# Patient Record
Sex: MALE | Race: WHITE | Employment: OTHER
[De-identification: names, ages, dates, MRNs, and addresses within clinical notes are randomized per-mention and may not be internally consistent; named-entity substitution may affect disease eponyms.]

---

## 2017-05-22 ENCOUNTER — HOSPITAL ENCOUNTER (OUTPATIENT)
Dept: PHYSICAL THERAPY | Facility: CLINIC | Age: 67
Setting detail: THERAPIES SERIES
Discharge: HOME | End: 2017-05-22
Payer: MEDICARE

## 2017-05-22 PROCEDURE — 97161 PT EVAL LOW COMPLEX 20 MIN: CPT

## 2017-05-22 PROCEDURE — G8979 MOBILITY GOAL STATUS: HCPCS

## 2017-05-22 PROCEDURE — G8978 MOBILITY CURRENT STATUS: HCPCS

## 2017-05-22 PROCEDURE — 97110 THERAPEUTIC EXERCISES: CPT

## 2017-05-24 ENCOUNTER — HOSPITAL ENCOUNTER (OUTPATIENT)
Dept: PHYSICAL THERAPY | Facility: CLINIC | Age: 67
Setting detail: THERAPIES SERIES
Discharge: HOME | End: 2017-05-24
Payer: MEDICARE

## 2017-05-24 PROCEDURE — 97110 THERAPEUTIC EXERCISES: CPT

## 2017-05-30 ENCOUNTER — HOSPITAL ENCOUNTER (OUTPATIENT)
Dept: PHYSICAL THERAPY | Facility: CLINIC | Age: 67
Setting detail: THERAPIES SERIES
Discharge: HOME | End: 2017-05-30
Payer: MEDICARE

## 2017-05-31 ENCOUNTER — HOSPITAL ENCOUNTER (OUTPATIENT)
Dept: PHYSICAL THERAPY | Facility: CLINIC | Age: 67
Setting detail: THERAPIES SERIES
Discharge: HOME | End: 2017-05-31
Payer: MEDICARE

## 2017-05-31 PROCEDURE — 97140 MANUAL THERAPY 1/> REGIONS: CPT

## 2017-05-31 PROCEDURE — 97110 THERAPEUTIC EXERCISES: CPT

## 2017-06-02 ENCOUNTER — HOSPITAL ENCOUNTER (OUTPATIENT)
Dept: PHYSICAL THERAPY | Facility: CLINIC | Age: 67
Setting detail: THERAPIES SERIES
Discharge: HOME | End: 2017-06-02
Payer: MEDICARE

## 2017-06-02 PROCEDURE — 97140 MANUAL THERAPY 1/> REGIONS: CPT

## 2017-06-02 PROCEDURE — 97110 THERAPEUTIC EXERCISES: CPT

## 2017-06-05 ENCOUNTER — HOSPITAL ENCOUNTER (OUTPATIENT)
Dept: PHYSICAL THERAPY | Facility: CLINIC | Age: 67
Setting detail: THERAPIES SERIES
Discharge: HOME | End: 2017-06-05
Payer: MEDICARE

## 2017-06-05 PROCEDURE — 97110 THERAPEUTIC EXERCISES: CPT

## 2017-06-05 PROCEDURE — 97140 MANUAL THERAPY 1/> REGIONS: CPT

## 2017-06-07 ENCOUNTER — HOSPITAL ENCOUNTER (OUTPATIENT)
Dept: PHYSICAL THERAPY | Facility: CLINIC | Age: 67
Setting detail: THERAPIES SERIES
Discharge: HOME | End: 2017-06-07
Payer: MEDICARE

## 2017-06-07 PROCEDURE — 97110 THERAPEUTIC EXERCISES: CPT

## 2017-06-09 ENCOUNTER — HOSPITAL ENCOUNTER (OUTPATIENT)
Dept: PHYSICAL THERAPY | Facility: CLINIC | Age: 67
Setting detail: THERAPIES SERIES
Discharge: HOME | End: 2017-06-09
Payer: MEDICARE

## 2017-06-09 PROCEDURE — 97140 MANUAL THERAPY 1/> REGIONS: CPT

## 2017-06-09 PROCEDURE — 97110 THERAPEUTIC EXERCISES: CPT

## 2017-06-12 ENCOUNTER — HOSPITAL ENCOUNTER (OUTPATIENT)
Dept: PHYSICAL THERAPY | Facility: CLINIC | Age: 67
Setting detail: THERAPIES SERIES
Discharge: HOME | End: 2017-06-12
Payer: MEDICARE

## 2017-06-12 PROCEDURE — 97110 THERAPEUTIC EXERCISES: CPT

## 2017-06-12 PROCEDURE — 97140 MANUAL THERAPY 1/> REGIONS: CPT

## 2017-06-14 ENCOUNTER — HOSPITAL ENCOUNTER (OUTPATIENT)
Dept: PHYSICAL THERAPY | Facility: CLINIC | Age: 67
Setting detail: THERAPIES SERIES
Discharge: HOME | End: 2017-06-14
Payer: MEDICARE

## 2017-06-14 PROCEDURE — 97140 MANUAL THERAPY 1/> REGIONS: CPT

## 2017-06-14 PROCEDURE — 97110 THERAPEUTIC EXERCISES: CPT

## 2017-06-16 ENCOUNTER — HOSPITAL ENCOUNTER (OUTPATIENT)
Dept: PHYSICAL THERAPY | Facility: CLINIC | Age: 67
Setting detail: THERAPIES SERIES
Discharge: HOME | End: 2017-06-16
Payer: MEDICARE

## 2017-06-16 PROCEDURE — 97032 APPL MODALITY 1+ESTIM EA 15: CPT

## 2017-06-16 PROCEDURE — 97110 THERAPEUTIC EXERCISES: CPT

## 2017-06-21 ENCOUNTER — HOSPITAL ENCOUNTER (OUTPATIENT)
Dept: PHYSICAL THERAPY | Facility: CLINIC | Age: 67
Setting detail: THERAPIES SERIES
Discharge: HOME | End: 2017-06-21
Payer: MEDICARE

## 2017-06-21 PROCEDURE — 97110 THERAPEUTIC EXERCISES: CPT

## 2017-06-21 PROCEDURE — 97140 MANUAL THERAPY 1/> REGIONS: CPT

## 2017-06-23 ENCOUNTER — HOSPITAL ENCOUNTER (OUTPATIENT)
Dept: PHYSICAL THERAPY | Facility: CLINIC | Age: 67
Setting detail: THERAPIES SERIES
Discharge: HOME | End: 2017-06-23
Payer: MEDICARE

## 2017-06-23 PROCEDURE — G8979 MOBILITY GOAL STATUS: HCPCS

## 2017-06-23 PROCEDURE — 97110 THERAPEUTIC EXERCISES: CPT

## 2017-06-23 PROCEDURE — 97140 MANUAL THERAPY 1/> REGIONS: CPT

## 2017-06-23 PROCEDURE — G8978 MOBILITY CURRENT STATUS: HCPCS

## 2017-06-26 ENCOUNTER — HOSPITAL ENCOUNTER (OUTPATIENT)
Dept: PHYSICAL THERAPY | Facility: CLINIC | Age: 67
Setting detail: THERAPIES SERIES
Discharge: HOME | End: 2017-06-26
Payer: MEDICARE

## 2017-06-26 PROCEDURE — 97110 THERAPEUTIC EXERCISES: CPT

## 2017-06-26 PROCEDURE — 97140 MANUAL THERAPY 1/> REGIONS: CPT

## 2017-06-28 ENCOUNTER — HOSPITAL ENCOUNTER (OUTPATIENT)
Dept: PHYSICAL THERAPY | Facility: CLINIC | Age: 67
Setting detail: THERAPIES SERIES
Discharge: HOME | End: 2017-06-28
Payer: MEDICARE

## 2017-06-28 PROCEDURE — 97140 MANUAL THERAPY 1/> REGIONS: CPT

## 2017-06-28 PROCEDURE — 97110 THERAPEUTIC EXERCISES: CPT

## 2017-06-30 ENCOUNTER — HOSPITAL ENCOUNTER (OUTPATIENT)
Dept: PHYSICAL THERAPY | Facility: CLINIC | Age: 67
Setting detail: THERAPIES SERIES
Discharge: HOME | End: 2017-06-30
Payer: MEDICARE

## 2017-06-30 PROCEDURE — 97110 THERAPEUTIC EXERCISES: CPT

## 2017-07-05 ENCOUNTER — HOSPITAL ENCOUNTER (OUTPATIENT)
Dept: PHYSICAL THERAPY | Facility: CLINIC | Age: 67
Setting detail: THERAPIES SERIES
Discharge: HOME | End: 2017-07-05
Payer: MEDICARE

## 2017-07-05 PROCEDURE — 97110 THERAPEUTIC EXERCISES: CPT

## 2017-07-07 ENCOUNTER — HOSPITAL ENCOUNTER (OUTPATIENT)
Dept: PHYSICAL THERAPY | Facility: CLINIC | Age: 67
Setting detail: THERAPIES SERIES
Discharge: HOME | End: 2017-07-07
Payer: MEDICARE

## 2017-07-07 PROCEDURE — 97140 MANUAL THERAPY 1/> REGIONS: CPT

## 2017-07-07 PROCEDURE — 97110 THERAPEUTIC EXERCISES: CPT

## 2017-07-10 ENCOUNTER — HOSPITAL ENCOUNTER (OUTPATIENT)
Dept: PHYSICAL THERAPY | Facility: CLINIC | Age: 67
Setting detail: THERAPIES SERIES
Discharge: HOME | End: 2017-07-10
Payer: MEDICARE

## 2017-07-12 ENCOUNTER — HOSPITAL ENCOUNTER (OUTPATIENT)
Dept: PHYSICAL THERAPY | Facility: CLINIC | Age: 67
Setting detail: THERAPIES SERIES
Discharge: HOME | End: 2017-07-12
Payer: MEDICARE

## 2017-07-12 PROCEDURE — 97140 MANUAL THERAPY 1/> REGIONS: CPT

## 2017-07-12 PROCEDURE — 97110 THERAPEUTIC EXERCISES: CPT

## 2017-07-14 ENCOUNTER — APPOINTMENT (OUTPATIENT)
Dept: PHYSICAL THERAPY | Facility: CLINIC | Age: 67
End: 2017-07-14
Payer: MEDICARE

## 2017-07-17 ENCOUNTER — APPOINTMENT (OUTPATIENT)
Dept: PHYSICAL THERAPY | Facility: CLINIC | Age: 67
End: 2017-07-17
Payer: MEDICARE

## 2017-07-19 ENCOUNTER — HOSPITAL ENCOUNTER (OUTPATIENT)
Dept: PHYSICAL THERAPY | Facility: CLINIC | Age: 67
Setting detail: THERAPIES SERIES
Discharge: HOME | End: 2017-07-19
Payer: MEDICARE

## 2017-07-19 PROCEDURE — 97110 THERAPEUTIC EXERCISES: CPT

## 2017-07-21 ENCOUNTER — HOSPITAL ENCOUNTER (OUTPATIENT)
Dept: PHYSICAL THERAPY | Facility: CLINIC | Age: 67
Setting detail: THERAPIES SERIES
Discharge: HOME | End: 2017-07-21
Payer: MEDICARE

## 2017-07-21 PROCEDURE — G8978 MOBILITY CURRENT STATUS: HCPCS

## 2017-07-21 PROCEDURE — G8979 MOBILITY GOAL STATUS: HCPCS

## 2017-07-21 PROCEDURE — 97164 PT RE-EVAL EST PLAN CARE: CPT

## 2017-07-21 PROCEDURE — 97110 THERAPEUTIC EXERCISES: CPT

## 2017-07-26 ENCOUNTER — HOSPITAL ENCOUNTER (OUTPATIENT)
Dept: PHYSICAL THERAPY | Facility: CLINIC | Age: 67
Setting detail: THERAPIES SERIES
Discharge: HOME | End: 2017-07-26
Payer: MEDICARE

## 2017-07-26 PROCEDURE — 97110 THERAPEUTIC EXERCISES: CPT

## 2017-07-28 ENCOUNTER — APPOINTMENT (OUTPATIENT)
Dept: PHYSICAL THERAPY | Facility: CLINIC | Age: 67
End: 2017-07-28
Payer: MEDICARE

## 2017-08-04 ENCOUNTER — HOSPITAL ENCOUNTER (OUTPATIENT)
Dept: PHYSICAL THERAPY | Facility: CLINIC | Age: 67
Setting detail: THERAPIES SERIES
Discharge: HOME | End: 2017-08-04
Payer: MEDICARE

## 2017-08-04 PROCEDURE — 97110 THERAPEUTIC EXERCISES: CPT

## 2017-08-07 ENCOUNTER — HOSPITAL ENCOUNTER (OUTPATIENT)
Dept: PHYSICAL THERAPY | Facility: CLINIC | Age: 67
Setting detail: THERAPIES SERIES
Discharge: HOME | End: 2017-08-07
Payer: MEDICARE

## 2017-08-07 PROCEDURE — 97110 THERAPEUTIC EXERCISES: CPT

## 2018-05-26 ENCOUNTER — HOSPITAL ENCOUNTER (INPATIENT)
Age: 68
LOS: 1 days | Discharge: HOME | DRG: 918 | End: 2018-05-26
Attending: EMERGENCY MEDICINE | Admitting: FAMILY MEDICINE
Payer: MEDICARE

## 2018-05-26 VITALS
BODY MASS INDEX: 47.74 KG/M2 | TEMPERATURE: 97.7 F | HEART RATE: 51 BPM | DIASTOLIC BLOOD PRESSURE: 77 MMHG | WEIGHT: 315 LBS | SYSTOLIC BLOOD PRESSURE: 164 MMHG | OXYGEN SATURATION: 95 % | RESPIRATION RATE: 16 BRPM | HEIGHT: 68 IN

## 2018-05-26 DIAGNOSIS — E16.2 HYPOGLYCEMIA: Primary | ICD-10-CM

## 2018-05-26 PROBLEM — E79.0 HYPERURICEMIA: Chronic | Status: ACTIVE | Noted: 2018-05-26

## 2018-05-26 PROBLEM — G47.33 SLEEP APNEA, OBSTRUCTIVE: Chronic | Status: ACTIVE | Noted: 2018-05-26

## 2018-05-26 PROBLEM — T38.3X5A HYPOGLYCEMIA DUE TO INSULIN: Status: ACTIVE | Noted: 2018-05-26

## 2018-05-26 PROBLEM — E16.0 HYPOGLYCEMIA DUE TO INSULIN: Status: RESOLVED | Noted: 2018-05-26 | Resolved: 2018-05-26

## 2018-05-26 PROBLEM — I10 ESSENTIAL HYPERTENSION: Chronic | Status: ACTIVE | Noted: 2018-05-26

## 2018-05-26 PROBLEM — E16.0 HYPOGLYCEMIA DUE TO INSULIN: Status: ACTIVE | Noted: 2018-05-26

## 2018-05-26 PROBLEM — T38.3X5A HYPOGLYCEMIA DUE TO INSULIN: Status: RESOLVED | Noted: 2018-05-26 | Resolved: 2018-05-26

## 2018-05-26 PROBLEM — M10.00 IDIOPATHIC GOUT: Chronic | Status: ACTIVE | Noted: 2018-05-26

## 2018-05-26 LAB
ANION GAP SERPL CALCULATED.3IONS-SCNC: 14 MMOL/L (ref 9–17)
BUN BLDV-MCNC: 17 MG/DL (ref 8–23)
BUN/CREAT BLD: ABNORMAL (ref 9–20)
CALCIUM SERPL-MCNC: 9.3 MG/DL (ref 8.6–10.4)
CHLORIDE BLD-SCNC: 103 MMOL/L (ref 98–107)
CO2: 26 MMOL/L (ref 20–31)
CREAT SERPL-MCNC: 0.8 MG/DL (ref 0.7–1.2)
GFR AFRICAN AMERICAN: >60 ML/MIN
GFR NON-AFRICAN AMERICAN: >60 ML/MIN
GFR SERPL CREATININE-BSD FRML MDRD: ABNORMAL ML/MIN/{1.73_M2}
GFR SERPL CREATININE-BSD FRML MDRD: ABNORMAL ML/MIN/{1.73_M2}
GLUCOSE BLD-MCNC: 110 MG/DL (ref 75–110)
GLUCOSE BLD-MCNC: 114 MG/DL (ref 75–110)
GLUCOSE BLD-MCNC: 116 MG/DL (ref 75–110)
GLUCOSE BLD-MCNC: 155 MG/DL (ref 75–110)
GLUCOSE BLD-MCNC: 55 MG/DL (ref 70–99)
GLUCOSE BLD-MCNC: 64 MG/DL (ref 75–110)
POTASSIUM SERPL-SCNC: 4.7 MMOL/L (ref 3.7–5.3)
SODIUM BLD-SCNC: 143 MMOL/L (ref 135–144)

## 2018-05-26 PROCEDURE — 82947 ASSAY GLUCOSE BLOOD QUANT: CPT

## 2018-05-26 PROCEDURE — 36415 COLL VENOUS BLD VENIPUNCTURE: CPT

## 2018-05-26 PROCEDURE — 2580000003 HC RX 258: Performed by: EMERGENCY MEDICINE

## 2018-05-26 PROCEDURE — 99285 EMERGENCY DEPT VISIT HI MDM: CPT

## 2018-05-26 PROCEDURE — 1210000000 HC MED SURG R&B

## 2018-05-26 PROCEDURE — G0378 HOSPITAL OBSERVATION PER HR: HCPCS

## 2018-05-26 PROCEDURE — 83036 HEMOGLOBIN GLYCOSYLATED A1C: CPT

## 2018-05-26 PROCEDURE — 6370000000 HC RX 637 (ALT 250 FOR IP): Performed by: FAMILY MEDICINE

## 2018-05-26 PROCEDURE — 80048 BASIC METABOLIC PNL TOTAL CA: CPT

## 2018-05-26 PROCEDURE — 99239 HOSP IP/OBS DSCHRG MGMT >30: CPT | Performed by: FAMILY MEDICINE

## 2018-05-26 PROCEDURE — 6370000000 HC RX 637 (ALT 250 FOR IP): Performed by: NURSE PRACTITIONER

## 2018-05-26 RX ORDER — FUROSEMIDE 40 MG/1
TABLET ORAL
Status: ON HOLD | COMMUNITY
Start: 2017-02-13 | End: 2018-05-26 | Stop reason: HOSPADM

## 2018-05-26 RX ORDER — ONDANSETRON 4 MG/1
TABLET, ORALLY DISINTEGRATING ORAL
Status: ON HOLD | COMMUNITY
Start: 2017-12-18 | End: 2018-05-26 | Stop reason: HOSPADM

## 2018-05-26 RX ORDER — AMMONIUM LACTATE 12 G/100G
CREAM TOPICAL
COMMUNITY
Start: 2018-05-03

## 2018-05-26 RX ORDER — MECLIZINE HYDROCHLORIDE CHEWABLE TABLETS 25 MG/1
25 TABLET, CHEWABLE ORAL
Status: ON HOLD | COMMUNITY
Start: 2017-12-18 | End: 2018-05-26 | Stop reason: HOSPADM

## 2018-05-26 RX ORDER — DEXTROSE MONOHYDRATE 25 G/50ML
12.5 INJECTION, SOLUTION INTRAVENOUS PRN
Status: DISCONTINUED | OUTPATIENT
Start: 2018-05-26 | End: 2018-05-26 | Stop reason: HOSPADM

## 2018-05-26 RX ORDER — PANTOPRAZOLE SODIUM 40 MG/1
TABLET, DELAYED RELEASE ORAL
Status: ON HOLD | COMMUNITY
Start: 2017-10-20 | End: 2018-05-26 | Stop reason: HOSPADM

## 2018-05-26 RX ORDER — LORATADINE AND PSEUDOEPHEDRINE 10; 240 MG/1; MG/1
TABLET, EXTENDED RELEASE ORAL
Status: ON HOLD | COMMUNITY
Start: 2016-02-18 | End: 2018-05-26 | Stop reason: HOSPADM

## 2018-05-26 RX ORDER — PANTOPRAZOLE SODIUM 40 MG/1
40 TABLET, DELAYED RELEASE ORAL DAILY
Status: DISCONTINUED | OUTPATIENT
Start: 2018-05-26 | End: 2018-05-26 | Stop reason: HOSPADM

## 2018-05-26 RX ORDER — INSULIN GLARGINE 100 [IU]/ML
INJECTION, SOLUTION SUBCUTANEOUS
COMMUNITY

## 2018-05-26 RX ORDER — DEXTROSE MONOHYDRATE 50 MG/ML
100 INJECTION, SOLUTION INTRAVENOUS PRN
Status: DISCONTINUED | OUTPATIENT
Start: 2018-05-26 | End: 2018-05-26 | Stop reason: HOSPADM

## 2018-05-26 RX ORDER — DEXTROSE MONOHYDRATE 25 G/50ML
INJECTION, SOLUTION INTRAVENOUS
Status: DISCONTINUED
Start: 2018-05-26 | End: 2018-05-26 | Stop reason: HOSPADM

## 2018-05-26 RX ORDER — ACETAMINOPHEN 500 MG
1000 TABLET ORAL 2 TIMES DAILY
COMMUNITY

## 2018-05-26 RX ORDER — AMLODIPINE BESYLATE 5 MG/1
5 TABLET ORAL DAILY
Status: DISCONTINUED | OUTPATIENT
Start: 2018-05-26 | End: 2018-05-26

## 2018-05-26 RX ORDER — INSULIN GLARGINE 100 [IU]/ML
82 INJECTION, SOLUTION SUBCUTANEOUS NIGHTLY
Status: DISCONTINUED | OUTPATIENT
Start: 2018-05-26 | End: 2018-05-26 | Stop reason: HOSPADM

## 2018-05-26 RX ORDER — DEXTROSE MONOHYDRATE 25 G/50ML
25 INJECTION, SOLUTION INTRAVENOUS ONCE
Status: COMPLETED | OUTPATIENT
Start: 2018-05-26 | End: 2018-05-26

## 2018-05-26 RX ORDER — FUROSEMIDE 40 MG/1
40 TABLET ORAL DAILY
Status: DISCONTINUED | OUTPATIENT
Start: 2018-05-26 | End: 2018-05-26

## 2018-05-26 RX ORDER — CHLORTHALIDONE 25 MG/1
25 TABLET ORAL DAILY
Qty: 30 TABLET | Refills: 3 | Status: ON HOLD | OUTPATIENT
Start: 2018-05-26 | End: 2021-11-23

## 2018-05-26 RX ORDER — ASPIRIN 81 MG/1
81 TABLET ORAL DAILY
Status: DISCONTINUED | OUTPATIENT
Start: 2018-05-26 | End: 2018-05-26 | Stop reason: HOSPADM

## 2018-05-26 RX ORDER — ALISKIREN 150 MG/1
TABLET, FILM COATED ORAL
COMMUNITY
Start: 2017-08-11

## 2018-05-26 RX ORDER — METFORMIN HYDROCHLORIDE 500 MG/1
1000 TABLET, EXTENDED RELEASE ORAL
COMMUNITY
Start: 2016-05-12

## 2018-05-26 RX ORDER — GLIPIZIDE 5 MG/1
10 TABLET ORAL
Status: DISCONTINUED | OUTPATIENT
Start: 2018-05-26 | End: 2018-05-26 | Stop reason: HOSPADM

## 2018-05-26 RX ORDER — CHLORTHALIDONE 25 MG/1
25 TABLET ORAL DAILY
Status: DISCONTINUED | OUTPATIENT
Start: 2018-05-26 | End: 2018-05-26 | Stop reason: HOSPADM

## 2018-05-26 RX ORDER — NICOTINE POLACRILEX 4 MG
15 LOZENGE BUCCAL PRN
Status: DISCONTINUED | OUTPATIENT
Start: 2018-05-26 | End: 2018-05-26 | Stop reason: HOSPADM

## 2018-05-26 RX ORDER — ALLOPURINOL 300 MG/1
TABLET ORAL
Status: ON HOLD | COMMUNITY
Start: 2017-10-20 | End: 2018-05-26 | Stop reason: HOSPADM

## 2018-05-26 RX ORDER — AMLODIPINE BESYLATE 5 MG/1
TABLET ORAL
Status: ON HOLD | COMMUNITY
Start: 2017-08-11 | End: 2018-05-26 | Stop reason: HOSPADM

## 2018-05-26 RX ORDER — GLIPIZIDE 10 MG/1
TABLET, FILM COATED, EXTENDED RELEASE ORAL
COMMUNITY
Start: 2016-04-07

## 2018-05-26 RX ORDER — ALISKIREN 150 MG/1
150 TABLET, FILM COATED ORAL DAILY
Status: DISCONTINUED | OUTPATIENT
Start: 2018-05-26 | End: 2018-05-26 | Stop reason: HOSPADM

## 2018-05-26 RX ORDER — ALLOPURINOL 300 MG/1
300 TABLET ORAL DAILY
Status: DISCONTINUED | OUTPATIENT
Start: 2018-05-26 | End: 2018-05-26

## 2018-05-26 RX ADMIN — FUROSEMIDE 40 MG: 40 TABLET ORAL at 08:54

## 2018-05-26 RX ADMIN — PANTOPRAZOLE SODIUM 40 MG: 40 TABLET, DELAYED RELEASE ORAL at 08:54

## 2018-05-26 RX ADMIN — ASPIRIN 81 MG: 81 TABLET, COATED ORAL at 08:54

## 2018-05-26 RX ADMIN — ALLOPURINOL 300 MG: 300 TABLET ORAL at 08:54

## 2018-05-26 RX ADMIN — DEXTROSE MONOHYDRATE 25 G: 25 INJECTION, SOLUTION INTRAVENOUS at 03:04

## 2018-05-26 RX ADMIN — AMLODIPINE BESYLATE 5 MG: 5 TABLET ORAL at 08:54

## 2018-05-26 RX ADMIN — DEXTROSE AND SODIUM CHLORIDE 500 ML: 5; 450 INJECTION, SOLUTION INTRAVENOUS at 03:05

## 2018-05-26 RX ADMIN — Medication 15 G: at 06:28

## 2018-05-26 ASSESSMENT — PAIN SCALES - GENERAL: PAINLEVEL_OUTOF10: 0

## 2018-05-26 ASSESSMENT — ENCOUNTER SYMPTOMS
SHORTNESS OF BREATH: 0
ABDOMINAL PAIN: 0
DIARRHEA: 0
COUGH: 0
VOMITING: 0
SORE THROAT: 0
NAUSEA: 0

## 2018-05-27 LAB
ESTIMATED AVERAGE GLUCOSE: 160 MG/DL
HBA1C MFR BLD: 7.2 % (ref 4–6)

## 2020-03-03 ENCOUNTER — HOSPITAL ENCOUNTER (OUTPATIENT)
Dept: PHYSICAL THERAPY | Facility: CLINIC | Age: 70
Setting detail: THERAPIES SERIES
Discharge: HOME | End: 2020-03-03
Payer: MEDICARE

## 2020-03-03 PROCEDURE — 97161 PT EVAL LOW COMPLEX 20 MIN: CPT

## 2020-03-03 PROCEDURE — 97110 THERAPEUTIC EXERCISES: CPT

## 2020-03-03 NOTE — CONSULTS
[] [address]  [address]  [phone]  F: [phone] [] [address] [facility]   [address]  P: [phone]  F: [phone] [x] [place]  [address] [facility]  P: [phone]  F: [phone] [] [address]  [facility]   [address]   [place]: [phone]  F: [phone]      Physical Therapy Lower Extremity Evaluation    Date:  3/3/2020  Patient: Dena Corbett   : 1950  MRN: [id number]  Physician: Dr. Jacquelin Samuels: [facility]  Medical Diagnosis: L knee OA  Rehab Codes: E45.863, M25.662  Onset date: 3/3/17    Next Dr's appt. : 3/5    Subjective:   CC: pre op status for L TKA on 3/5. HPI: scheduled for L TKA on 3/5 due to OA in L knee. PMHx: [] Unremarkable [] Diabetes [] HTN  [] Pacemaker   [] MI/Heart Problems [x] Cancer  [] Arthritis [x] Other: Sabino ILA, Fused 2-5              [x] Refer to full medical chart  In EPIC       Comorbidities:   [x] Obesity [] Dialysis  [x] Other:T2DM   [] Asthma/COPD [] Dementia [x] Other: prostate CA   [] Stroke [] Sleep apnea [] Other:   [] Vascular disease [] Rheumatic disease [] Other:     Tests: [x] X-Ray: [] MRI:  [] Other:    Medications: [x] Refer to full medical record [] None [] Other:  Allergies:      [x] Refer to full medical record [] None [] Other:    Function:  Hand Dominance  [] Right  [] Left  Patient lives with:     In what type of home []  One story   [] Two story   [x] Split level   Number of stairs to enter 7+8   With handrail on the []  Right to enter   [x] Left to enter (7)   Bathroom has a []  Tub only  [] Tub/shower combo   [x] Walk in shower    [x]  Grab bars (1) only suctions not secured   Washing machine is on []  Main level   [] Second level   [] Basement   Employer    Job Other:    Specific Instructions for next treatment: he will be immediate post op      Evaluation Complexity:  History (Personal factors, comorbidities) [x] 0 [] 1-2 [] 3+   Exam (limitations, restrictions) [x] 1-2 [] 3 [] 4+   Clinical presentation (progression) [x] Stable [] Evolving  [] Unstable   Decision Making [x] Low [] Moderate [] High    [x] Low Complexity [] Moderate Complexity [] High Complexity       Treatment Charges: Mins Units   [x] Evaluation       [x]  Low       []  Moderate       []  High  1   []  Modalities     [x]  Ther Exercise 25 2   []  Manual Therapy     []  Ther Activities     []  Aquatics     []  Vasocompression     []  Other       TOTAL TREATMENT TIME: 50    Time in:1005   Time Out:1055    Electronically signed by: Tamika Rodríguez PT        Physician Signature:________________________________Date:__________________  By signing above or cosigning this note, I have reviewed this plan of care and certify a need for medically necessary rehabilitation services.      *PLEASE SIGN ABOVE AND FAX BACK ALL PAGES*

## 2020-03-09 ENCOUNTER — HOSPITAL ENCOUNTER (OUTPATIENT)
Dept: PHYSICAL THERAPY | Facility: CLINIC | Age: 70
Setting detail: THERAPIES SERIES
Discharge: HOME | End: 2020-03-09
Payer: MEDICARE

## 2020-03-09 PROCEDURE — 97016 VASOPNEUMATIC DEVICE THERAPY: CPT

## 2020-03-09 PROCEDURE — 97110 THERAPEUTIC EXERCISES: CPT

## 2020-03-09 NOTE — FLOWSHEET NOTE
[] [facility]  [address]  [phone]  F: [phone] [] [address]  [facility]   [address]  P: [phone]  F: [phone] [] [address]  [address] [facility]  P: [phone]  F: [phone] [] [address]  [facility]   [address]   [place]: [phone]  F: [phone]      Physical Therapy Daily Treatment Note    Date:  3/9/2020  Patient Name:  Cody Morales    :  1950  MRN: [id number]  Physician: Dr. Lopez Beams: [facility]  Medical Diagnosis: L knee OA                     Rehab Codes: S72.675, M25.662  Onset date: 3/3/17                                         Next Dr's appt. : 3/17  Visit# / total visits:    Cancels/No Shows: 0/0    Subjective:    Pain:  [x] Yes  [] No Location:L knee Pain Rating: (0-10 scale) 8/10  Pain altered Tx:  [x] No  [] Yes  Action:  Comments: reports that he had a fever this am and went to the hospital.  Dx'd with UTI. Was peeing blood, but has mostly resolved. He did not have a catheter.       Objective:  Modalities:   Treatment Location  Left      Right                          Position   knee [x]          []  [x] Supine    [] Prone   [] Side lying  [] Sitting          Treatment Modality   2 Vasocompression    34° temp    Med pressure     15min   1 Other:  ex       Precautions: standard TKA  Exercises:  Exercise Reps/ Time Weight/ Level Issued for HEP Completed Comments   Nu Step *                         Mat             Quad sets 10x10\"    x  x     SAQs` 3x10    x  x     Glut sets 10x10\"  x     SLR *           Bridges (attempts)            Heel slides AAROM 2x10   x x     Sidelying              Hip abd

## 2020-03-12 ENCOUNTER — HOSPITAL ENCOUNTER (OUTPATIENT)
Dept: PHYSICAL THERAPY | Facility: CLINIC | Age: 70
Setting detail: THERAPIES SERIES
Discharge: HOME | End: 2020-03-12
Payer: MEDICARE

## 2020-03-12 PROCEDURE — 97110 THERAPEUTIC EXERCISES: CPT

## 2020-03-12 PROCEDURE — 97016 VASOPNEUMATIC DEVICE THERAPY: CPT

## 2020-03-12 NOTE — FLOWSHEET NOTE
Demonstrates understanding. [] Needs review. [] Demonstrates/verbalizes HEP/Ed previously given. Plan: [x] Continue current frequency toward long and short term goals.     [] Specific Instructions for subsequent treatments: progress mat/gym ex      Time In: 9:00am            Time Out: 10:00am    Electronically signed by:  Nita Milian PTA

## 2020-03-16 ENCOUNTER — HOSPITAL ENCOUNTER (OUTPATIENT)
Dept: PHYSICAL THERAPY | Facility: CLINIC | Age: 70
Setting detail: THERAPIES SERIES
Discharge: HOME | End: 2020-03-16
Payer: MEDICARE

## 2020-03-16 PROCEDURE — 97110 THERAPEUTIC EXERCISES: CPT

## 2020-03-16 NOTE — FLOWSHEET NOTE
[] [address]  [address]  [phone]  F: [phone] [] [address]  formerly [facility] 36   [address]  P: [phone]  F: [phone] [x] [address]  [address] [facility]  P: [phone]  F: [phone] [] [address]  [facility]   [address]   [place]: [phone]  F: [phone]      Physical Therapy Daily Treatment Note    Date:  3/16/2020  Patient Name:  Ijeoma Winn    :  1950  MRN: [id number]  Physician: Dr. Keven Amor: [facility]  Medical Diagnosis: L knee OA                     Rehab Codes: D21.400, M25.662  Onset date: 3/3/17                                         Next Dr's appt. : 3/17  Visit# / total visits: 3/8   Cancels/No Shows: 0/0    Subjective:   Pt arrives noting continued L knee pain discomfort but is improving daily. Some compliance with HEP per pt but could be better per pt a swell.      Pain:  [x] Yes  [] No Location:L knee Pain Rating: (0-10 scale) 5-6/10  Pain altered Tx:  [x] No  [] Yes  Action:  Comments:       Objective:    L knee AAROM flexion: 94°    Modalities:   Treatment Location  Left      Right                          Position   knee [x]          []  [x] Supine    [] Prone   [] Side lying  [] Sitting          Treatment Modality   2 Vasocompression    34° temp    Med pressure     15min   1 Other:  ex       Precautions: standard TKA  Exercises:  Exercise Reps/ Time Weight/ Level Issued for HEP Completed Comments   Nu Step 10' L3   x  INC           Stool HS stretch 3x20\"   x    Gastroc Wedge 3x20\"      x     Mat             Quad sets 10x10\"   x x     SAQs 2x10  2# x x    Glut sets 10x10\"  x     SLR x10,x5 AAROM    x      Bridges (attempts)            Heel slides AAROM 2x10   x x     Supine knee

## 2020-03-19 ENCOUNTER — HOSPITAL ENCOUNTER (OUTPATIENT)
Dept: PHYSICAL THERAPY | Facility: CLINIC | Age: 70
Setting detail: THERAPIES SERIES
Discharge: HOME | End: 2020-03-19
Payer: MEDICARE

## 2020-03-19 PROCEDURE — 97110 THERAPEUTIC EXERCISES: CPT

## 2020-03-19 NOTE — FLOWSHEET NOTE
Heel slides AAROM 2x10        Supine knee ext ROM 5m 7# x Heel propped, poor tolerance only tolerated approx 45sec          Sidelying           Hip abd                   Prone        Knee hang *      Quad S *               Gym          Heel raises 20x    x     Marching  20x  x    Mini squats 20x  x    EXT ROM          TKE 15x5\"         TG squats 3x10 L14                               Other:    Treatment Charges: Mins Units   []  Modalities     [x]  Ther Exercise 50 3   []  Manual Therapy     []  Ther Activities     []  Aquatics     [x]  Vasocompression     []  Other     Total Treatment time 50 3       Assessment: [] Progressing toward goals. .        [] No change. [x] Other: Pt having difficulty with stamina today. Only able to perform marked exercises and required seated rest breaks and 2 bathroom breaks. Pt unable to complete 5 min with heel propped knee extension and stopped after 2 min. Pt also stopped vaso at end after 3 min. [x] Patient would continue to benefit from skilled physical therapy services in order to: regain ROM, strength, gait, and function. STG: (to be met in1 treatments)  1. Patient to be independent with home exercise program as demonstrated by performance with correct form without cues.     LTG: (to be met in 9 treatments)  1. Able to walk without deviation or device. 2. Pain <3/10 for the long term  3. Able to go shopping without need for device                    Patient goals: \"to feel better\"     Pt. Education:  [x] Yes  [] No  [x] Reviewed Prior HEP/Ed  Method of Education: [x] Verbal  [x] Demo  [] Written  Comprehension of Education:  [] Verbalizes understanding. [x] Demonstrates understanding. [x] Needs review. [] Demonstrates/verbalizes HEP/Ed previously given. Plan: [x] Continue current frequency toward long and short term goals.     [x] Specific Instructions for subsequent treatments: progress mat/gym ex      Time In: 10:00am            Time Out: 11:00am    Electronically signed by:  Natan Terry PTA

## 2020-03-23 ENCOUNTER — HOSPITAL ENCOUNTER (OUTPATIENT)
Dept: PHYSICAL THERAPY | Facility: CLINIC | Age: 70
Setting detail: THERAPIES SERIES
Discharge: HOME | End: 2020-03-23
Payer: MEDICARE

## 2020-03-23 PROCEDURE — 97016 VASOPNEUMATIC DEVICE THERAPY: CPT

## 2020-03-23 PROCEDURE — 97110 THERAPEUTIC EXERCISES: CPT

## 2020-03-23 NOTE — FLOWSHEET NOTE
[] [address]  [address]  [phone]  F: [phone] [] [address]  [facility] 36   [address]  P: [phone]  F: [phone] [x] [address]  [address] [facility]  P: [phone]  F: [phone] [] [address]  [facility]   [address]   [place]: [phone]  F: [phone]      Physical Therapy Daily Treatment Note    Date:  3/23/2020  Patient Name:  Sarath De La Cruz    :  1950  MRN: [id number]  Physician: Dr. Allison Cardona: [facility]  Medical Diagnosis: L knee OA                     Rehab Codes: O74.989, M25.662  Onset date: 3/3/17                                         Next Dr's appt. : 2020  Visit# / total visits:    Cancels/No Shows: 0/0    Subjective:        Pain:  [x] Yes  [] No Location:L knee Pain Rating: (0-10 scale) 5/10  Pain altered Tx:  [x] No  [] Yes  Action:  Comments:  Pt reports L knee pain continues to be moderate, he also states he has not been wearing his compression stockings since Saturday even though his physician recommended him to wear them until tomorrow. PTA advised him to put them back on due to pitting edema in L lower leg.       Objective:    L knee AAROM flexion: 94°  L kneee AAROM in supine 5-113 degrees 3/23/20  Modalities:   Treatment Location  Left      Right                          Position   knee [x]          []  [x] Supine    [] Prone   [] Side lying  [] Sitting          Treatment Modality   2 Vasocompression    34° temp    Med pressure     15min   1 Other:  ex       Precautions: standard TKA  Exercises:  Exercise Reps/ Time Weight/ Level Completed Comments   Sci Fit 8' L3 x  INC          Stool HS stretch 3x30\"  x    Gastroc Wedge 3x20\"    x     Knee flexion S 10x10\" 12\" x Mat           Quad sets 10x10\"   x     SAQs 2x10  2# x    LAQ 20x3\"  x    Glut sets 10x10\"      SLR x10,x5 AAROM       Bridges (attempts)         Heel slides AAROM 2x10   x     Supine knee ext ROM 2m  x Heel propped          Sidelying           Hip abd                   Prone        Knee hang *   Pt states he doesn't have a place to lay on his stomach at home   Quad S *               Gym          Heel raises 20x    x     Marching  20x  x    Mini squats 20x  x    HS curl 20x  x    EXT ROM          TKE 15x5\"         TG squats 3x10 L14  x                             Other:    Treatment Charges: Mins Units   []  Modalities     [x]  Ther Exercise 50 3   []  Manual Therapy     []  Ther Activities     []  Aquatics     [x]  Vasocompression 10 1   []  Other     Total Treatment time 60 4       Assessment: [x] Progressing toward goals. Able to progress pt with standing exercises as listed above with good tolerance. Pt given mod cues for upright posture when ambulating with slight improvement noted. Added heel prop in supine without weight, able to complete x2 min to improve L knee ext. Pt demos improved L knee AAROM 5 degrees to Pt was given handout with standing exercises, TKE with band, quad sets, SAQ, LAQ, heel slides with strap, and seated propped extension stretch with good understanding by pt.     [] No change. [] Other:   [x] Patient would continue to benefit from skilled physical therapy services in order to: regain ROM, strength, gait, and function. STG: (to be met in1 treatments)  1. Patient to be independent with home exercise program as demonstrated by performance with correct form without cues.     LTG: (to be met in 9 treatments)  1. Able to walk without deviation or device. 2. Pain <3/10 for the long term  3. Able to go shopping without need for device                    Patient goals: \"to feel better\"     Pt.  Education:  [x] Yes  [] No  [x] Reviewed Prior HEP/Ed  Method of Education: [x] Verbal

## 2020-03-26 ENCOUNTER — HOSPITAL ENCOUNTER (OUTPATIENT)
Dept: PHYSICAL THERAPY | Facility: CLINIC | Age: 70
Setting detail: THERAPIES SERIES
Discharge: HOME | End: 2020-03-26
Payer: MEDICARE

## 2020-03-26 PROCEDURE — 97110 THERAPEUTIC EXERCISES: CPT

## 2020-03-26 PROCEDURE — 97016 VASOPNEUMATIC DEVICE THERAPY: CPT

## 2020-03-26 NOTE — FLOWSHEET NOTE
[] [address]  [address]  [phone]  F: [phone] [] [address]  [facility]   [address]  P: [phone]  F: [phone] [x] [place]  [address] [facility]  P: [phone]  F: [phone] [] [address]  [facility]   [address]   [place]: [phone]  F: [phone]      Physical Therapy Daily Treatment Note    Date:  3/26/2020  Patient Name:  Isaias Jean    :  1950  MRN: [id number]  Physician: Dr. Hilda Edmondson: [facility]  Medical Diagnosis: L knee OA                     Rehab Codes: G77.470, M25.662  Onset date: 3/3/17                                         Next 's appt. : 2020  Visit# / total visits: 7/8   Cancels/No Shows: 0/0    Subjective:        Pain:  [x] Yes  [] No Location:L knee Pain Rating: (0-10 scale) 4-5/10  Pain altered Tx:  [x] No  [] Yes  Action:  Comments:  Pt reports L knee is feeling a little better. Pt reports semi-compliance with HEP.      Objective:    L knee AAROM flexion: 94°  L kneee AAROM in supine 5-113 degrees 3/23/20  Modalities:   Treatment Location  Left      Right                          Position   knee [x]          []  [x] Supine    [] Prone   [] Side lying  [] Sitting          Treatment Modality   2 Vasocompression    34° temp    Med pressure     15min   1 Other:  ex       Precautions: standard TKA  Exercises:  Exercise Reps/ Time Weight/ Level Completed Comments   Sci Fit 8' L3 x  INC          Stool HS stretch 3x30\"  x    Gastroc Wedge 3x20\"    x     Knee flexion S 10x10\" 12\" x    Mat           Quad sets 10x10\"   -     SAQs 2x10  2# -    LAQ 20x3\" 2# x    Glut sets 10x10\"      SLR x10,x5 AAROM       Bridges (attempts)         Heel slides AAROM 2x10   -     seated knee

## 2020-03-30 ENCOUNTER — HOSPITAL ENCOUNTER (OUTPATIENT)
Dept: PHYSICAL THERAPY | Facility: CLINIC | Age: 70
Setting detail: THERAPIES SERIES
Discharge: HOME | End: 2020-03-30
Payer: MEDICARE

## 2020-04-02 ENCOUNTER — HOSPITAL ENCOUNTER (OUTPATIENT)
Dept: PHYSICAL THERAPY | Facility: CLINIC | Age: 70
Setting detail: THERAPIES SERIES
Discharge: HOME | End: 2020-04-02
Payer: MEDICARE

## 2020-04-02 NOTE — FLOWSHEET NOTE
[] [address]  [address]    [phone]  F: [phone]   [] [address] [facility] [address]  [facility] 36   [address]  P: [phone]  F: [phone]  [x] [place]  [address] [facility]  P: [phone]  F: [phone]  [] [address]  [address] [facility] [address]   [address]   [place]: [phone]  F: [phone]   [] [facility]  [address] [facility] [address]  [place]: [phone]   F: [phone]     Physical Therapy Cancel/No Show note    Date: 2020  Patient: Hali Adams  : 1950  MRN: [id number]    Cancels/No Shows to date:     For today's appointment patient:    []  Cancelled    [] Rescheduled appointment    [x] No-show     Reason given by patient:    []  Patient ill    []  Conflicting appointment    [] No transportation      [] Conflict with work    [] No reason given    [] Weather related    [x] Other:      Comments:  Pt is scared of Covid-19. Will call back once everything is over.       [] Next appointment was confirmed    Electronically signed by: Miky Becker PT

## 2021-04-29 ENCOUNTER — HOSPITAL ENCOUNTER (OUTPATIENT)
Dept: MRI IMAGING | Facility: CLINIC | Age: 71
Discharge: HOME | End: 2021-05-01
Payer: MEDICARE

## 2021-04-29 DIAGNOSIS — I63.50 POSTERIOR CIRCULATION STROKE (HCC): ICD-10-CM

## 2021-04-29 PROCEDURE — 70551 MRI BRAIN STEM W/O DYE: CPT

## 2021-11-22 RX ORDER — ALLOPURINOL 300 MG/1
300 TABLET ORAL DAILY
COMMUNITY

## 2021-11-22 RX ORDER — AMLODIPINE BESYLATE 10 MG/1
10 TABLET ORAL DAILY
COMMUNITY

## 2021-11-22 RX ORDER — FUROSEMIDE 40 MG/1
40 TABLET ORAL DAILY
COMMUNITY

## 2021-11-22 RX ORDER — PANTOPRAZOLE SODIUM 40 MG/1
40 GRANULE, DELAYED RELEASE ORAL
COMMUNITY

## 2021-11-22 RX ORDER — LORATADINE 10 MG/1
10 CAPSULE, LIQUID FILLED ORAL DAILY
COMMUNITY

## 2021-11-23 ENCOUNTER — HOSPITAL ENCOUNTER (OUTPATIENT)
Dept: CARDIAC CATH/INVASIVE PROCEDURES | Age: 71
Discharge: HOME | End: 2021-11-23
Attending: INTERNAL MEDICINE | Admitting: INTERNAL MEDICINE
Payer: MEDICARE

## 2021-11-23 ENCOUNTER — ANESTHESIA EVENT (OUTPATIENT)
Dept: CARDIAC CATH/INVASIVE PROCEDURES | Age: 71
End: 2021-11-23
Payer: MEDICARE

## 2021-11-23 ENCOUNTER — ANESTHESIA (OUTPATIENT)
Dept: CARDIAC CATH/INVASIVE PROCEDURES | Age: 71
End: 2021-11-23
Payer: MEDICARE

## 2021-11-23 VITALS
TEMPERATURE: 97.5 F | HEIGHT: 68 IN | DIASTOLIC BLOOD PRESSURE: 60 MMHG | BODY MASS INDEX: 45.63 KG/M2 | OXYGEN SATURATION: 96 % | WEIGHT: 301.1 LBS | RESPIRATION RATE: 14 BRPM | SYSTOLIC BLOOD PRESSURE: 131 MMHG | HEART RATE: 80 BPM

## 2021-11-23 VITALS — OXYGEN SATURATION: 95 % | DIASTOLIC BLOOD PRESSURE: 82 MMHG | SYSTOLIC BLOOD PRESSURE: 168 MMHG

## 2021-11-23 LAB
GLUCOSE BLD-MCNC: 132 MG/DL (ref 75–110)
GLUCOSE BLD-MCNC: 157 MG/DL (ref 75–110)

## 2021-11-23 PROCEDURE — 2500000003 HC RX 250 WO HCPCS: Performed by: NURSE ANESTHETIST, CERTIFIED REGISTERED

## 2021-11-23 PROCEDURE — 2580000003 HC RX 258: Performed by: NURSE ANESTHETIST, CERTIFIED REGISTERED

## 2021-11-23 PROCEDURE — G0269 OCCLUSIVE DEVICE IN VEIN ART: HCPCS | Performed by: INTERNAL MEDICINE

## 2021-11-23 PROCEDURE — 2709999900 HC NON-CHARGEABLE SUPPLY

## 2021-11-23 PROCEDURE — 2580000003 HC RX 258: Performed by: INTERNAL MEDICINE

## 2021-11-23 PROCEDURE — 7100000001 HC PACU RECOVERY - ADDTL 15 MIN

## 2021-11-23 PROCEDURE — 3700000000 HC ANESTHESIA ATTENDED CARE

## 2021-11-23 PROCEDURE — C1730 CATH, EP, 19 OR FEW ELECT: HCPCS

## 2021-11-23 PROCEDURE — 2580000003 HC RX 258

## 2021-11-23 PROCEDURE — 6360000002 HC RX W HCPCS

## 2021-11-23 PROCEDURE — C2630 CATH, EP, COOL-TIP: HCPCS

## 2021-11-23 PROCEDURE — 3700000001 HC ADD 15 MINUTES (ANESTHESIA)

## 2021-11-23 PROCEDURE — C1760 CLOSURE DEV, VASC: HCPCS

## 2021-11-23 PROCEDURE — 82947 ASSAY GLUCOSE BLOOD QUANT: CPT

## 2021-11-23 PROCEDURE — 93005 ELECTROCARDIOGRAM TRACING: CPT | Performed by: INTERNAL MEDICINE

## 2021-11-23 PROCEDURE — 6360000002 HC RX W HCPCS: Performed by: NURSE ANESTHETIST, CERTIFIED REGISTERED

## 2021-11-23 PROCEDURE — 2500000003 HC RX 250 WO HCPCS

## 2021-11-23 PROCEDURE — 93654 COMPRE EP EVAL TX VT: CPT | Performed by: INTERNAL MEDICINE

## 2021-11-23 PROCEDURE — C1894 INTRO/SHEATH, NON-LASER: HCPCS

## 2021-11-23 PROCEDURE — 7100000000 HC PACU RECOVERY - FIRST 15 MIN

## 2021-11-23 RX ORDER — SODIUM CHLORIDE 9 MG/ML
INJECTION, SOLUTION INTRAVENOUS CONTINUOUS
Status: DISCONTINUED | OUTPATIENT
Start: 2021-11-23 | End: 2021-11-23 | Stop reason: HOSPADM

## 2021-11-23 RX ORDER — FENTANYL CITRATE 50 UG/ML
INJECTION, SOLUTION INTRAMUSCULAR; INTRAVENOUS PRN
Status: DISCONTINUED | OUTPATIENT
Start: 2021-11-23 | End: 2021-11-23 | Stop reason: SDUPTHER

## 2021-11-23 RX ORDER — FENTANYL CITRATE 50 UG/ML
25 INJECTION, SOLUTION INTRAMUSCULAR; INTRAVENOUS EVERY 5 MIN PRN
Status: DISCONTINUED | OUTPATIENT
Start: 2021-11-23 | End: 2021-11-23 | Stop reason: HOSPADM

## 2021-11-23 RX ORDER — ACETAMINOPHEN 325 MG/1
650 TABLET ORAL EVERY 4 HOURS PRN
Status: DISCONTINUED | OUTPATIENT
Start: 2021-11-23 | End: 2021-11-23 | Stop reason: HOSPADM

## 2021-11-23 RX ORDER — ACETAMINOPHEN 325 MG/1
TABLET ORAL
Status: DISCONTINUED
Start: 2021-11-23 | End: 2021-11-23 | Stop reason: HOSPADM

## 2021-11-23 RX ORDER — PROPOFOL 10 MG/ML
INJECTION, EMULSION INTRAVENOUS CONTINUOUS PRN
Status: DISCONTINUED | OUTPATIENT
Start: 2021-11-23 | End: 2021-11-23 | Stop reason: SDUPTHER

## 2021-11-23 RX ORDER — LIDOCAINE HYDROCHLORIDE 20 MG/ML
INJECTION, SOLUTION EPIDURAL; INFILTRATION; INTRACAUDAL; PERINEURAL PRN
Status: DISCONTINUED | OUTPATIENT
Start: 2021-11-23 | End: 2021-11-23 | Stop reason: SDUPTHER

## 2021-11-23 RX ORDER — SODIUM CHLORIDE 0.9 % (FLUSH) 0.9 %
5-40 SYRINGE (ML) INJECTION EVERY 12 HOURS SCHEDULED
Status: DISCONTINUED | OUTPATIENT
Start: 2021-11-23 | End: 2021-11-23 | Stop reason: HOSPADM

## 2021-11-23 RX ORDER — SODIUM CHLORIDE 0.9 % (FLUSH) 0.9 %
5-40 SYRINGE (ML) INJECTION PRN
Status: DISCONTINUED | OUTPATIENT
Start: 2021-11-23 | End: 2021-11-23 | Stop reason: HOSPADM

## 2021-11-23 RX ORDER — SODIUM CHLORIDE 9 MG/ML
25 INJECTION, SOLUTION INTRAVENOUS PRN
Status: DISCONTINUED | OUTPATIENT
Start: 2021-11-23 | End: 2021-11-23 | Stop reason: HOSPADM

## 2021-11-23 RX ORDER — HEPARIN SODIUM 10000 [USP'U]/ML
INJECTION, SOLUTION INTRAVENOUS; SUBCUTANEOUS PRN
Status: DISCONTINUED | OUTPATIENT
Start: 2021-11-23 | End: 2021-11-23 | Stop reason: SDUPTHER

## 2021-11-23 RX ORDER — PROTAMINE SULFATE 10 MG/ML
INJECTION, SOLUTION INTRAVENOUS PRN
Status: DISCONTINUED | OUTPATIENT
Start: 2021-11-23 | End: 2021-11-23 | Stop reason: SDUPTHER

## 2021-11-23 RX ORDER — HYDROMORPHONE HYDROCHLORIDE 1 MG/ML
0.25 INJECTION, SOLUTION INTRAMUSCULAR; INTRAVENOUS; SUBCUTANEOUS EVERY 5 MIN PRN
Status: DISCONTINUED | OUTPATIENT
Start: 2021-11-23 | End: 2021-11-23 | Stop reason: HOSPADM

## 2021-11-23 RX ORDER — ONDANSETRON 2 MG/ML
4 INJECTION INTRAMUSCULAR; INTRAVENOUS
Status: DISCONTINUED | OUTPATIENT
Start: 2021-11-23 | End: 2021-11-23 | Stop reason: HOSPADM

## 2021-11-23 RX ORDER — MIDAZOLAM HYDROCHLORIDE 1 MG/ML
INJECTION INTRAMUSCULAR; INTRAVENOUS PRN
Status: DISCONTINUED | OUTPATIENT
Start: 2021-11-23 | End: 2021-11-23 | Stop reason: SDUPTHER

## 2021-11-23 RX ADMIN — Medication 50 MCG: at 11:45

## 2021-11-23 RX ADMIN — Medication 50 MCG: at 10:12

## 2021-11-23 RX ADMIN — SODIUM CHLORIDE: 9 INJECTION, SOLUTION INTRAVENOUS at 08:29

## 2021-11-23 RX ADMIN — ISOPROTERENOL HYDROCHLORIDE 2 MCG/MIN: 0.2 INJECTION, SOLUTION INTRAMUSCULAR; INTRAVENOUS at 10:41

## 2021-11-23 RX ADMIN — PROPOFOL 75 MCG/KG/MIN: 10 INJECTION, EMULSION INTRAVENOUS at 10:12

## 2021-11-23 RX ADMIN — LIDOCAINE HYDROCHLORIDE 100 MG: 20 INJECTION, SOLUTION EPIDURAL; INFILTRATION; INTRACAUDAL; PERINEURAL at 09:56

## 2021-11-23 RX ADMIN — PROTAMINE SULFATE 95 MG: 10 INJECTION, SOLUTION INTRAVENOUS at 11:45

## 2021-11-23 RX ADMIN — PROTAMINE SULFATE 5 MG: 10 INJECTION, SOLUTION INTRAVENOUS at 11:38

## 2021-11-23 RX ADMIN — MIDAZOLAM 2 MG: 1 INJECTION INTRAMUSCULAR; INTRAVENOUS at 09:56

## 2021-11-23 RX ADMIN — HEPARIN SODIUM 14000 UNITS: 10000 INJECTION, SOLUTION INTRAVENOUS; SUBCUTANEOUS at 11:06

## 2021-11-23 ASSESSMENT — PULMONARY FUNCTION TESTS
PIF_VALUE: 1
PIF_VALUE: 0
PIF_VALUE: 1

## 2021-11-23 ASSESSMENT — PAIN SCALES - GENERAL: PAINLEVEL_OUTOF10: 10

## 2021-11-23 ASSESSMENT — PAIN DESCRIPTION - DESCRIPTORS: DESCRIPTORS: ACHING

## 2021-11-23 ASSESSMENT — PAIN DESCRIPTION - ORIENTATION: ORIENTATION: LEFT

## 2021-11-23 ASSESSMENT — PAIN DESCRIPTION - LOCATION: LOCATION: SHOULDER

## 2021-11-23 ASSESSMENT — PAIN DESCRIPTION - PAIN TYPE: TYPE: ACUTE PAIN;CHRONIC PAIN

## 2021-11-23 ASSESSMENT — PAIN DESCRIPTION - PROGRESSION: CLINICAL_PROGRESSION: GRADUALLY WORSENING

## 2021-11-23 ASSESSMENT — PAIN DESCRIPTION - ONSET: ONSET: ON-GOING

## 2021-11-23 ASSESSMENT — PAIN DESCRIPTION - FREQUENCY: FREQUENCY: CONTINUOUS

## 2021-11-23 NOTE — ANESTHESIA PRE PROCEDURE
Department of Anesthesiology  Preprocedure Note       Name:  Megan Parent   Age:  72 y.o.  :  1950                                          MRN:  [id number]         Date:  2021      Surgeon: * No surgeons listed *    Procedure: * No procedures listed *    Medications prior to admission:   Prior to Admission medications    Medication Sig Start Date End Date Taking? Authorizing Provider   allopurinol (ZYLOPRIM) 300 MG tablet Take 300 mg by mouth daily   Yes Historical Provider, MD   amLODIPine (NORVASC) 10 MG tablet Take 10 mg by mouth daily   Yes Historical Provider, MD   loratadine (CLARITIN) 10 MG capsule Take 10 mg by mouth daily   Yes Historical Provider, MD   furosemide (LASIX) 40 MG tablet Take 40 mg by mouth daily   Yes Historical Provider, MD   pantoprazole sodium (PROTONIX) 40 MG PACK packet Take 40 mg by mouth every morning (before breakfast)   Yes Historical Provider, MD   aspirin 81 MG tablet Take 81 mg by mouth Take 2 daily   Yes Historical Provider, MD   glipiZIDE (GLUCOTROL XL) 10 MG extended release tablet once daily Indications: 2 tabs. 16  Yes Historical Provider, MD   insulin lispro (HUMALOG) 100 UNIT/ML injection vial Indications: 38 units at breakfast and lunch, 52 units at supper.    16  Yes Historical Provider, MD   insulin glargine (LANTUS) 100 UNIT/ML injection vial Inject into the skin   Yes Historical Provider, MD   metFORMIN (GLUCOPHAGE-XR) 500 MG extended release tablet 1,000 mg 16  Yes Historical Provider, MD   Prenatal Vit-Fe Fumarate-FA (M-VIT PO) Take 1 tablet by mouth   Yes Historical Provider, MD   aliskiren (TEKTURNA) 150 MG tablet TAKE 1 TABLET DAILY 17  Yes Historical Provider, MD   acetaminophen (TYLENOL) 500 MG tablet Take 1,000 mg by mouth 2 times daily   Yes Historical Provider, MD   ammonium lactate (AMLACTIN) 12 % cream Apply topically 5/3/18   Historical Provider, MD       Current medications:    Current Facility-Administered Medications Medication Dose Route Frequency Provider Last Rate Last Admin    0.9 % sodium chloride infusion   IntraVENous Continuous Belle Espino MD           Allergies: Allergies   Allergen Reactions    Ace Inhibitors     Augmentin [Amoxicillin-Pot Clavulanate]     Byetta 10 Mcg Pen [Exenatide]     Ciprofloxacin     Erythromycin     Invokana [Canagliflozin]     Statins     Other Rash     chlorohexadine       Problem List:    Patient Active Problem List   Diagnosis Code    Essential hypertension I10    Insulin dependent diabetes mellitus [id number]    Hyperuricemia E79.0    Sleep apnea, obstructive G47.33       Past Medical History:        Diagnosis Date    Atypical chest pain     Complete heart block (HCC)     Diabetes mellitus (HCC)     Frequent PVCs     Hyperlipidemia     Hypertension     LVH (left ventricular hypertrophy)     Pacemaker     Prostate CA (HCC)     Sinus bradycardia     Tricuspid valve regurgitation     Vertigo        Past Surgical History:        Procedure Laterality Date    BACK SURGERY      CARDIAC CATHETERIZATION      FINGER TRIGGER RELEASE      JOINT REPLACEMENT      bilateral hips    OTHER SURGICAL HISTORY      thumb joint replacement    TOTAL HIP ARTHROPLASTY         Social History:    Social History     Tobacco Use    Smoking status: Never Smoker    Smokeless tobacco: Never Used   Substance Use Topics    Alcohol use:  No                                Counseling given: Not Answered      Vital Signs (Current):   Vitals:    11/23/21 0800   BP: (!) 158/64   Pulse: (!) 38   Resp: 20   Temp: 97.9 °F (36.6 °C)   TempSrc: Temporal   SpO2: 97%                                              BP Readings from Last 3 Encounters:   11/23/21 (!) 158/64   05/26/18 (!) 164/77       NPO Status:                                                                                 BMI:   Wt Readings from Last 3 Encounters:   05/26/18 (!) 319 lb 10.7 oz (145 kg)     There is no height or weight on file to calculate BMI.    CBC: No results found for: WBC, RBC, HGB, HCT, MCV, RDW, PLT    CMP:   Lab Results   Component Value Date     05/26/2018    K 4.7 05/26/2018     05/26/2018    CO2 26 05/26/2018    BUN 17 05/26/2018    CREATININE 0.80 05/26/2018    GFRAA >60 05/26/2018    LABGLOM >60 05/26/2018    GLUCOSE 55 05/26/2018    CALCIUM 9.3 05/26/2018       POC Tests: No results for input(s): POCGLU, POCNA, POCK, POCCL, POCBUN, POCHEMO, POCHCT in the last 72 hours. Coags: No results found for: PROTIME, INR, APTT    HCG (If Applicable): No results found for: PREGTESTUR, PREGSERUM, HCG, HCGQUANT     ABGs: No results found for: PHART, PO2ART, AJB2GYT, KHH3KFQ, BEART, Z4WVXCFL     Type & Screen (If Applicable):  No results found for: LABABO, LABRH    Drug/Infectious Status (If Applicable):  No results found for: HIV, HEPCAB    COVID-19 Screening (If Applicable): No results found for: COVID19        Anesthesia Evaluation     history of anesthetic complications: PONV. Airway: Mallampati: III  TM distance: >3 FB   Neck ROM: full  Mouth opening: > = 3 FB Dental:          Pulmonary:normal exam    (+) sleep apnea:                             Cardiovascular:  Exercise tolerance: poor (<4 METS),   (+) hypertension:, pacemaker: pacemaker, dysrhythmias (heart block, pvcs):, MCLAIN:, hyperlipidemia    (-)  angina        Rate: normal                    Neuro/Psych:   (+) depression/anxiety             GI/Hepatic/Renal:   (+) GERD: well controlled, morbid obesity          Endo/Other:    (+) Diabetes, malignancy/cancer (prostate). ROS comment: tmj   Abdominal:             Vascular: Other Findings: Placement Date: 10/07/20; Placement Time: 0912 (created via procedure documentation);  Type: Cuffed; Tube Size: 7.5 mm; Laryngoscope: Mac; Blade Size: 3; Location: Oral; Grade View: 2; Insertion Attempts: 1; Placement Verification: Auscultation, End tidal CO2, Symmetrical chest wall movement; Secured at: 24 cm; Removal Date: 10/07/20; Removal Time: 1018          Anesthesia Plan      general and MAC     ASA 3     (Likely tiva with 1 piv   Plan b for geta with a line )  Induction: intravenous. arterial line  MIPS: Postoperative opioids intended and Prophylactic antiemetics administered. Anesthetic plan and risks discussed with patient. Use of blood products discussed with patient whom. Plan discussed with CRNA.     Attending anesthesiologist reviewed and agrees with Preprocedure content            Nadege Saba MD   11/23/2021

## 2021-11-23 NOTE — OP NOTE
Operative Note      Patient: Maria Del Carmen Winston  YOB: 1950  MRN: [id number]    Date of Procedure: 11/23/2021    Pre-Op Diagnosis: premature ventricular complexes    Post-Op Diagnosis: Same  Electrophysiology study and ablation. Anesthesia: MAC    Estimated Blood Loss (mL): Minimal    Complications: None    Detailed Description of Procedure:   Patient was brought to the electrophysiology in the fasting state and will go to continuous hemodynamic monitoring. This includes continuous pulse oximetry telemetry and intermittent blood pressure recording. Vascular ultrasound was used to visualize the right femoral artery and access was obtained. The right femoral vein was accessed twice and 2 guidewires were placed in the vein. Patient's vein was behind the artery making venous access difficult even with ultrasound. A decapolar cath was advanced to coronary sinus and advanced to the anterior interventricular vein. PVCs were right bundle inferior axis with no transition. The coronary sinus catheter had an early electrogram which is about 46 ms before the QRS. Pacing from the distal coronary sinus showed an 11/12 pace match. I then advanced the ablation catheter through the femoral vein to the anterior interventricular vein. Ablation was done here at 21 W for 60 seconds however PVCs did not disappear. I then advanced the ablation catheter retrogradely across the aortic valve to the area underneath the right coronary cusp. PVC was about 40 ms early here and far field atrial electrogram was recorded. Ablation was done with termination of PVCs. Multiple lesions were placed here at 27 W to consolidate the lesion. Patient had a pacemaker which was programmed DDD 60 prior to the procedure and reprogrammed to DDI 60 due to recurrent episodes of PMT and atrial tachycardia.   Patient had complete AV block diagnosed on his previous EP study and therefore no aggressive EP testing was done  Isoproterenol was given up to 5 mcg to induce PVCs and no PVCs could be induced. Please note the patient had no antegrade AV conduction but had retrograde VA conduction noted and episodes of PMT. The NGUYEN mapping system was used to form a 3-dimensional image of the left ventricle Craig for EP study and ablation. Was used to locate catheter positions. The tacticath ablation catheter was used for ablation. Impression  Comprehensive EPS and ablation  3D mapping with NGUYEN mapping system  Device interrogation before and after the procedure  Plan  Monitor for 4h and discharge home. Follow up in one week for device reprogramming.   Electrophysiology study  Ablation of pvcs originating from the LV summit  Device  Electronically signed by Roman Car MD on 11/23/2021 at 12:11 PM

## 2021-11-24 LAB
EKG ATRIAL RATE: 40 BPM
EKG P-R INTERVAL: 152 MS
EKG Q-T INTERVAL: 432 MS
EKG QRS DURATION: 172 MS
EKG QTC CALCULATION (BAZETT): 482 MS
EKG R AXIS: 72 DEGREES
EKG T AXIS: -89 DEGREES
EKG VENTRICULAR RATE: 75 BPM

## 2021-11-24 PROCEDURE — 93010 ELECTROCARDIOGRAM REPORT: CPT | Performed by: INTERNAL MEDICINE

## 2022-09-02 ENCOUNTER — HOSPITAL ENCOUNTER (EMERGENCY)
Facility: CLINIC | Age: 72
Discharge: HOME | End: 2022-09-02
Attending: EMERGENCY MEDICINE
Payer: MEDICARE

## 2022-09-02 VITALS
WEIGHT: 310 LBS | HEART RATE: 64 BPM | OXYGEN SATURATION: 95 % | TEMPERATURE: 98.4 F | BODY MASS INDEX: 46.98 KG/M2 | RESPIRATION RATE: 16 BRPM | SYSTOLIC BLOOD PRESSURE: 146 MMHG | HEIGHT: 68 IN | DIASTOLIC BLOOD PRESSURE: 79 MMHG

## 2022-09-02 DIAGNOSIS — T38.3X1A INSULIN OVERDOSE, ACCIDENTAL OR UNINTENTIONAL, INITIAL ENCOUNTER: ICD-10-CM

## 2022-09-02 DIAGNOSIS — E16.2 HYPOGLYCEMIA: Primary | ICD-10-CM

## 2022-09-02 LAB
ABSOLUTE EOS #: 0.5 K/UL (ref 0–0.4)
ABSOLUTE LYMPH #: 1.3 K/UL (ref 1–4.8)
ABSOLUTE MONO #: 1 K/UL (ref 0.1–1.2)
ANION GAP SERPL CALCULATED.3IONS-SCNC: 13 MMOL/L (ref 9–17)
BASOPHILS # BLD: 1 % (ref 0–2)
BASOPHILS ABSOLUTE: 0.1 K/UL (ref 0–0.2)
BUN BLDV-MCNC: 22 MG/DL (ref 8–23)
CALCIUM SERPL-MCNC: 9.4 MG/DL (ref 8.6–10.4)
CHLORIDE BLD-SCNC: 102 MMOL/L (ref 98–107)
CO2: 27 MMOL/L (ref 20–31)
CREAT SERPL-MCNC: 0.9 MG/DL (ref 0.7–1.2)
EOSINOPHILS RELATIVE PERCENT: 6 % (ref 1–4)
GFR AFRICAN AMERICAN: >60 ML/MIN
GFR NON-AFRICAN AMERICAN: >60 ML/MIN
GFR SERPL CREATININE-BSD FRML MDRD: ABNORMAL ML/MIN/{1.73_M2}
GLUCOSE BLD-MCNC: 51 MG/DL (ref 75–110)
GLUCOSE BLD-MCNC: 71 MG/DL (ref 75–110)
GLUCOSE BLD-MCNC: 72 MG/DL (ref 70–99)
HCT VFR BLD CALC: 38.8 % (ref 41–53)
HEMOGLOBIN: 12.5 G/DL (ref 13.5–17.5)
LYMPHOCYTES # BLD: 15 % (ref 24–44)
MCH RBC QN AUTO: 28.4 PG (ref 26–34)
MCHC RBC AUTO-ENTMCNC: 32.1 G/DL (ref 31–37)
MCV RBC AUTO: 88.4 FL (ref 80–100)
MONOCYTES # BLD: 12 % (ref 2–11)
PDW BLD-RTO: 15.2 % (ref 12.5–15.4)
PLATELET # BLD: 239 K/UL (ref 140–450)
PMV BLD AUTO: 8.4 FL (ref 6–12)
POTASSIUM SERPL-SCNC: 3.4 MMOL/L (ref 3.7–5.3)
RBC # BLD: 4.39 M/UL (ref 4.5–5.9)
SEG NEUTROPHILS: 66 % (ref 36–66)
SEGMENTED NEUTROPHILS ABSOLUTE COUNT: 5.6 K/UL (ref 1.8–7.7)
SODIUM BLD-SCNC: 142 MMOL/L (ref 135–144)
WBC # BLD: 8.4 K/UL (ref 3.5–11)

## 2022-09-02 PROCEDURE — 2580000003 HC RX 258: Performed by: EMERGENCY MEDICINE

## 2022-09-02 PROCEDURE — 82947 ASSAY GLUCOSE BLOOD QUANT: CPT

## 2022-09-02 PROCEDURE — 99284 EMERGENCY DEPT VISIT MOD MDM: CPT

## 2022-09-02 PROCEDURE — 36415 COLL VENOUS BLD VENIPUNCTURE: CPT

## 2022-09-02 PROCEDURE — 2500000003 HC RX 250 WO HCPCS: Performed by: EMERGENCY MEDICINE

## 2022-09-02 PROCEDURE — 2500000003 HC RX 250 WO HCPCS

## 2022-09-02 PROCEDURE — 96365 THER/PROPH/DIAG IV INF INIT: CPT

## 2022-09-02 PROCEDURE — 85025 COMPLETE CBC W/AUTO DIFF WBC: CPT

## 2022-09-02 PROCEDURE — 96376 TX/PRO/DX INJ SAME DRUG ADON: CPT

## 2022-09-02 PROCEDURE — 96366 THER/PROPH/DIAG IV INF ADDON: CPT

## 2022-09-02 PROCEDURE — 80048 BASIC METABOLIC PNL TOTAL CA: CPT

## 2022-09-02 RX ORDER — DEXTROSE 5 % IN WATER
500 PIGGYBACK WITH THREADED PORT (ML) INTRAVENOUS ONCE
Status: COMPLETED | OUTPATIENT
Start: 2022-09-02 | End: 2022-09-02

## 2022-09-02 RX ORDER — DEXTROSE MONOHYDRATE 25 G/50ML
INJECTION, SOLUTION INTRAVENOUS
Status: COMPLETED
Start: 2022-09-02 | End: 2022-09-02

## 2022-09-02 RX ORDER — ISOSORBIDE MONONITRATE 30 MG/1
30 TABLET, EXTENDED RELEASE ORAL DAILY
COMMUNITY

## 2022-09-02 RX ORDER — DEXTROSE MONOHYDRATE 25 G/50ML
12.5 INJECTION, SOLUTION INTRAVENOUS ONCE
Status: COMPLETED | OUTPATIENT
Start: 2022-09-02 | End: 2022-09-02

## 2022-09-02 RX ADMIN — DEXTROSE MONOHYDRATE 500 ML: 50 INJECTION, SOLUTION INTRAVENOUS at 01:12

## 2022-09-02 RX ADMIN — DEXTROSE MONOHYDRATE 25 ML: 25 INJECTION, SOLUTION INTRAVENOUS at 01:50

## 2022-09-02 RX ADMIN — DEXTROSE MONOHYDRATE 500 ML: 50 INJECTION, SOLUTION INTRAVENOUS at 04:35

## 2022-09-02 RX ADMIN — DEXTROSE MONOHYDRATE 12.5 G: 25 INJECTION, SOLUTION INTRAVENOUS at 02:40

## 2022-09-02 ASSESSMENT — PAIN - FUNCTIONAL ASSESSMENT
PAIN_FUNCTIONAL_ASSESSMENT: NONE - DENIES PAIN
PAIN_FUNCTIONAL_ASSESSMENT: NONE - DENIES PAIN

## 2022-09-02 NOTE — ED NOTES
Pt presents to ED c/o hypoglycemia. Pt states he accidentally took 90 units of his Humalog instead of his Lantus. Per EMS, pts blood glucose was 101 on scene. Upon arrival to ED 15 minutes later, pts glucose was 71 on POC monitor. Pt arrives A/Ox4, PWD, PMS intact. Pt resting on stretcher with call light in reach.      Destiney Gomez RN  09/02/22 4060

## 2022-09-02 NOTE — ED NOTES
Blood glucose 97 per pts continuous glucose monitor 30 minutes after receiving 50% dextrose bolus.      Mindy Pathak RN  09/02/22 2430

## 2022-09-02 NOTE — ED NOTES
Pts blood glucose 156 per continuous glucose monitor. D5W rate decreased to 75ml/hr.      Trent Perez RN  09/02/22 1968

## 2022-09-02 NOTE — ED NOTES
Food provided to patient, per Dr. Donald Leary. Pt and family updated on plan of care. No needs voiced at this time. RR even and non labored. NAD noted at this time. Call light within reach.       [place] RENZO Kauffman  09/02/22 1393

## 2022-09-02 NOTE — ED NOTES
Pts blood glucose 127 per continuous glucose monitor. Dr. Nova Hernández notified.      Charleen Clay RN  09/02/22 2411

## 2022-09-02 NOTE — ED NOTES
Pt ambulates with steady gait to and from restroom. Blood sugar 172. Dr. Rodney notified. No needs voiced at this time. NAD noted at this time.         Mo Flores RN  09/02/22 8997

## 2022-09-02 NOTE — ED PROVIDER NOTES
[facility] ED  [address] [facility]  Phone: [phone]        ADDENDUM:      Care of this patient was assumed from []. The patient was seen for Hypoglycemia  . The patient's initial evaluation and plan have been discussed with the prior provider who initially evaluated the patient. Nursing Notes, Past Medical Hx, Past Surgical Hx, Allergies, were all reviewed. PAST MEDICAL HISTORY    has a past medical history of Asthma, Atypical chest pain, Complete heart block ([address] Diabetes mellitus ([address] Frequent PVCs, Hyperlipidemia, Hypertension, LVH (left ventricular hypertrophy), Pacemaker, PONV (postoperative nausea and vomiting), Prostate CA ([address] Sinus bradycardia, Tricuspid valve regurgitation, and Vertigo. SURGICAL HISTORY      has a past surgical history that includes joint replacement; back surgery; Cardiac catheterization; Finger trigger release; other surgical history; and Total hip arthroplasty.     CURRENT MEDICATIONS       Discharge Medication List as of 9/2/2022  7:57 AM        CONTINUE these medications which have NOT CHANGED    Details   isosorbide mononitrate (IMDUR) 30 MG extended release tablet Take 30 mg by mouth dailyHistorical Med      ticagrelor (BRILINTA) 90 MG TABS tablet Take 90 mg by mouth 2 times dailyHistorical Med      allopurinol (ZYLOPRIM) 300 MG tablet Take 300 mg by mouth dailyHistorical Med      amLODIPine (NORVASC) 10 MG tablet Take 10 mg by mouth dailyHistorical Med      loratadine (CLARITIN) 10 MG capsule Take 10 mg by mouth dailyHistorical Med      furosemide (LASIX) 40 MG tablet Take 40 mg by mouth dailyHistorical Med      pantoprazole sodium (PROTONIX) 40 MG PACK packet Take 40 mg by mouth every morning (before breakfast)Historical Med      aspirin 81 MG tablet Take 81 mg by mouth Take 2 dailyHistorical Med      ammonium lactate (AMLACTIN) 12 % cream Apply topically, Topical, Starting Thu 5/3/2018, Historical Med      glipiZIDE (GLUCOTROL XL) 10 MG extended release tablet once daily Indications: 2 tabs. Historical Med      insulin lispro (HUMALOG) 100 UNIT/ML injection vial Indications: 38 units at breakfast and lunch, 52 units at supper. Historical Med      insulin glargine (LANTUS) 100 UNIT/ML injection vial Inject into the skinHistorical Med      metFORMIN (GLUCOPHAGE-XR) 500 MG extended release tablet 1,000 mgHistorical Med      Prenatal Vit-Fe Fumarate-FA (M-VIT PO) Take 1 tablet by mouthHistorical Med      aliskiren (TEKTURNA) 150 MG tablet TAKE 1 TABLET DAILYHistorical Med      acetaminophen (TYLENOL) 500 MG tablet Take 1,000 mg by mouth 2 times dailyHistorical Med             ALLERGIES     is allergic to ace inhibitors, augmentin [amoxicillin-pot clavulanate], byetta 10 mcg pen [exenatide], ciprofloxacin, erythromycin, invokana [canagliflozin], statins, and other.       Diagnostic Results     EKG: All EKG's are interpreted by the Emergency Department Physician who either signs or Co-signs this chart in the [address] a cardiologist.    []    RADIOLOGY:    []    Interpretation per the Radiologist below, if available at the time of this note:    []    LABS:   Results for orders placed or performed during the hospital encounter of 09/02/22   CBC with Auto Differential   Result Value Ref Range    WBC 8.4 3.5 - 11.0 k/uL    RBC 4.39 (L) 4.5 - 5.9 m/uL    Hemoglobin 12.5 (L) 13.5 - 17.5 g/dL    Hematocrit 38.8 (L) 41 - 53 %    MCV 88.4 80 - 100 fL    MCH 28.4 26 - 34 pg    MCHC 32.1 31 - 37 g/dL    RDW 15.2 12.5 - 15.4 %    Platelets 814 559 - 393 k/uL    MPV 8.4 6.0 - 12.0 fL    Seg Neutrophils 66 36 - 66 %    Lymphocytes 15 (L) 24 - 44 %    Monocytes 12 (H) 2 - 11 %    Eosinophils % 6 (H) 1 - 4 %    Basophils 1 0 - 2 %    Segs Absolute 5.60 1.8 - 7.7 k/uL    Absolute Lymph # 1.30 1.0 - 4.8 k/uL    Absolute Mono # 1.00 0.1 - 1.2 k/uL    Absolute Eos # 0.50 (H) 0.0 - 0.4 k/uL    Basophils Absolute 0.10 0.0 - 0.2 k/uL   Basic Metabolic Panel   Result Value Ref Range    Glucose 72 70 - 99 mg/dL    BUN 22 8 - 23 mg/dL    Creatinine 0.90 0.70 - 1.20 mg/dL    Calcium 9.4 8.6 - 10.4 mg/dL    Sodium 142 135 - 144 mmol/L    Potassium 3.4 (L) 3.7 - 5.3 mmol/L    Chloride 102 98 - 107 mmol/L    CO2 27 20 - 31 mmol/L    Anion Gap 13 9 - 17 mmol/L    GFR Non-African American >60 >60 mL/min    GFR African American >60 >60 mL/min    GFR Comment         POC Glucose Fingerstick   Result Value Ref Range    POC Glucose 71 (L) 75 - 110 mg/dL   POC Glucose Fingerstick   Result Value Ref Range    POC Glucose 51 (L) 75 - 110 mg/dL       RADIOLOGY:  No orders to display       RECENT VITALS:  BP: (!) 146/79, Temp: 98.4 °F (36.9 °C), Heart Rate: 64, Resp: 16     ED Course     The patient was given the following medications:  Orders Placed This Encounter   Medications    dextrose 5 % solution 500 mL    dextrose 50 % solution     Satish Mcintosh: cabinet override    dextrose 50 % IV solution    dextrose 50 % IV solution    dextrose 5 % solution 500 mL       Medical Decision Making         Patient was signed out pending reevaluation. He had been on a D5 drip, its been discontinued for approximately 90 minutes he ate breakfast, his blood sugars actually trending up. At this time I do think patient is stable for discharge. I have discussed making sure he is using the correct insulin and following up with PCP as well as what to return to ER for    At this time the patient is without objective evidence of an acute process requiring hospitalization or inpatient management. They have remained hemodynamically stable and are stable for discharge with outpatient follow-up. Standard anticipatory guidance given to patient upon discharge. Have given them a specific time frame in which to follow-up and who to follow-up with. I have also advised them that they should return to the emergency department if they get worse, or not getting better or develop any new or concerning symptoms.   Patient demonstrates understanding. EMERGENCY DEPARTMENT COURSE:   Vitals:    Vitals:    09/02/22 0055 09/02/22 0633   BP: (!) 147/76 (!) 146/79   Pulse: 70 64   Resp: 16 16   Temp: 98.4 °F (36.9 °C)    TempSrc: Oral    SpO2: 95% 95%   Weight: (!) 140.6 kg (310 lb)    Height: 5' 8\" (1.727 m)      -------------------------  BP: (!) 146/79, Temp: 98.4 °F (36.9 °C), Heart Rate: 64, Resp: 16      RE-EVALUATION:  Stable    CONSULTS:  None    PROCEDURES:  None    FINAL IMPRESSION      1. Hypoglycemia    2.  Insulin overdose, accidental or unintentional, initial encounter          DISPOSITION/PLAN   DISPOSITION Decision To Discharge 09/02/2022 07:56:28 AM      CONDITION ON DISPOSITION:   stable    PATIENT REFERRED TO:  fItikhar Alan  [address] [place] [address], [address]  [address]    In 3 days      DISCHARGE MEDICATIONS:  Discharge Medication List as of 9/2/2022  7:57 AM          (Please note that portions of this note were completed with a voicerecognition program.  Efforts were made to edit the dictations but occasionally words are mis-transcribed.)    Surjit Lake DO  Attending Emergency Medicine Physician                     Surjit Lake DO  09/02/22 0707

## 2022-09-02 NOTE — ED PROVIDER NOTES
eMERGENCY dEPARTMENT eNCOUnter      Pt Name: Ebenezer Koenig  MRN: [id number]  [place] 1950  Date of evaluation: 9/2/2022      CHIEF COMPLAINT       Chief Complaint   Patient presents with    Hypoglycemia         HISTORY OF PRESENT ILLNESS    Ebenezer Koenig is a 80 y.o. male who presents after accidental insulin overdose. Patient states that he normally receives Lantus at night however he had accidentally giving himself 90 units of regular insulin he denies any symptoms this time however he states that his sugar has been dropping        REVIEW OF SYSTEMS       Review of systems are all reviewed and negative except stated above in HPI    PAST MEDICAL HISTORY    has a past medical history of Asthma, Atypical chest pain, Complete heart block ([address] Diabetes mellitus ([address] Frequent PVCs, Hyperlipidemia, Hypertension, LVH (left ventricular hypertrophy), Pacemaker, PONV (postoperative nausea and vomiting), Prostate CA ([address] Sinus bradycardia, Tricuspid valve regurgitation, and Vertigo. SURGICAL HISTORY      has a past surgical history that includes joint replacement; back surgery; Cardiac catheterization; Finger trigger release; other surgical history; and Total hip arthroplasty. CURRENT MEDICATIONS       Previous Medications    ACETAMINOPHEN (TYLENOL) 500 MG TABLET    Take 1,000 mg by mouth 2 times daily    ALISKIREN (TEKTURNA) 150 MG TABLET    TAKE 1 TABLET DAILY    ALLOPURINOL (ZYLOPRIM) 300 MG TABLET    Take 300 mg by mouth daily    AMLODIPINE (NORVASC) 10 MG TABLET    Take 10 mg by mouth daily    AMMONIUM LACTATE (AMLACTIN) 12 % CREAM    Apply topically    ASPIRIN 81 MG TABLET    Take 81 mg by mouth Take 2 daily    FUROSEMIDE (LASIX) 40 MG TABLET    Take 40 mg by mouth daily    GLIPIZIDE (GLUCOTROL XL) 10 MG EXTENDED RELEASE TABLET    once daily Indications: 2 tabs.       INSULIN GLARGINE (LANTUS) 100 UNIT/ML INJECTION VIAL    Inject into the skin    INSULIN LISPRO (HUMALOG) 100 UNIT/ML INJECTION VIAL    Indications: 38 units at breakfast and lunch, 52 units at supper. ISOSORBIDE MONONITRATE (IMDUR) 30 MG EXTENDED RELEASE TABLET    Take 30 mg by mouth daily    LORATADINE (CLARITIN) 10 MG CAPSULE    Take 10 mg by mouth daily    METFORMIN (GLUCOPHAGE-XR) 500 MG EXTENDED RELEASE TABLET    1,000 mg    PANTOPRAZOLE SODIUM (PROTONIX) 40 MG PACK PACKET    Take 40 mg by mouth every morning (before breakfast)    PRENATAL VIT-FE FUMARATE-FA (M-VIT PO)    Take 1 tablet by mouth    TICAGRELOR (BRILINTA) 90 MG TABS TABLET    Take 90 mg by mouth 2 times daily       ALLERGIES     is allergic to ace inhibitors, augmentin [amoxicillin-pot clavulanate], byetta 10 mcg pen [exenatide], ciprofloxacin, erythromycin, invokana [canagliflozin], statins, and other. FAMILY HISTORY     He indicated that the status of his father is unknown.     family history includes No Known Problems in his father. SOCIAL HISTORY      reports that he has never smoked. He has never used smokeless tobacco. He reports that he does not drink alcohol and does not use drugs. PHYSICAL EXAM     INITIAL VITALS:  height is 5' 8\" (1.727 m) and weight is 140.6 kg (310 lb) (abnormal). His oral temperature is 98.4 °F (36.9 °C). His blood pressure is 146/79 (abnormal) and his pulse is 64. His respiration is 16 and oxygen saturation is 95%.       General: Patient alert nontoxic-appearing male in no apparent distress  HEENT: Head is atraumatic conjunctive are clear mouth shows moist mucous membranes  Neck: Supple  Respiratory: Lung sounds are clear bilateral  Cardiac: Heart is regular rate and rhythm  Neuro: Patient has no gross focal neurological deficits at bedside exam    DIFFERENTIAL DIAGNOSIS/ MDM:     Hypoglycemia    DIAGNOSTIC RESULTS     EKG: All EKG's are interpreted by the Emergency Department Physician who either signs or Co-signs this chart in the absence of a cardiologist.      RADIOLOGY:   I directly visualized the following  images and reviewed the radiologist interpretations:  No orders to display         LABS:  Labs Reviewed   CBC WITH AUTO DIFFERENTIAL - Abnormal; Notable for the following components:       Result Value    RBC 4.39 (*)     Hemoglobin 12.5 (*)     Hematocrit 38.8 (*)     Lymphocytes 15 (*)     Monocytes 12 (*)     Eosinophils % 6 (*)     Absolute Eos # 0.50 (*)     All other components within normal limits   BASIC METABOLIC PANEL - Abnormal; Notable for the following components:    Potassium 3.4 (*)     All other components within normal limits   POC GLUCOSE FINGERSTICK - Abnormal; Notable for the following components:    POC Glucose 71 (*)     All other components within normal limits   POC GLUCOSE FINGERSTICK - Abnormal; Notable for the following components:    POC Glucose 51 (*)     All other components within normal limits         EMERGENCY DEPARTMENT COURSE:   Vitals:    Vitals:    09/02/22 0055 09/02/22 0633   BP: (!) 147/76 (!) 146/79   Pulse: 70 64   Resp: 16 16   Temp: 98.4 °F (36.9 °C)    TempSrc: Oral    SpO2: 95% 95%   Weight: (!) 140.6 kg (310 lb)    Height: 5' 8\" (1.727 m)      -------------------------  BP: (!) 146/79, Temp: 98.4 °F (36.9 °C), Heart Rate: 64, Resp: 16    Orders Placed This Encounter   Medications    dextrose 5 % solution 500 mL    dextrose 50 % solution     Satish Diallo: cabinet override    dextrose 50 % IV solution    dextrose 50 % IV solution    dextrose 5 % solution 500 mL           Re-evaluation Notes    Patient was started on a D5 drip frequent blood sugars at 1 point he received half amp of D50 2 times was increased to 200 and then slowly decreased throughout the night at this time care will be turned over to oncoming physician Dr. Lazaro Urena secondary to end my shift for final disposition    CRITICAL CARE:   None      CONSULTS:      PROCEDURES:  None    FINAL IMPRESSION    No diagnosis found.       DISPOSITION/PLAN   DISPOSITION        Condition on Disposition        PATIENT REFERRED TO:  No follow-up provider specified. DISCHARGE MEDICATIONS:  New Prescriptions    No medications on file       (Please note that portions of this note were completed with a voice recognition program.  Efforts were made to edit the dictations but occasionally words are mis-transcribed.)    Adore Newby MD,, MD, F.A.C.E.P.   Attending Emergency Physician        Adore Newby MD  09/02/22 7294

## 2022-09-02 NOTE — ED NOTES
Pts blood glucose 145 per continuous glucose monitor. Dr. Hernandes Colfax notified. D5W rate reduced to 125ml/hr.      Meghann Marley RN  09/02/22 9530

## 2022-09-02 NOTE — ED NOTES
Pts blood glucose 151 per continuous glucose monitor 25 minutes after 50% dextrose bolus and D5W rate change. Dr. Frandy Hutson notified. Will continue to monitor.      Atif Corcoran RN  09/02/22 2942

## 2022-09-02 NOTE — ED NOTES
Pts blood glucose 136 per continuous glucose monitor. Dr. Danette Antoine notified and D5W rate decreased from 200ml/hr to 175ml/hr.      Kristie Cazares RN  09/02/22 5752

## 2022-09-02 NOTE — ED NOTES
Blood glucose at 156. D5W infusion stopped per Dr. David Tristan.      Mindy Pathak RN  09/02/22 0493

## 2022-09-02 NOTE — ED NOTES
Pts blood glucose 159 per continuous glucose monitor. D5W rate decreased to 25ml/hr. Dr. Almas Rojo notified.      Mariella Rock, RN  09/02/22 2917

## 2023-09-05 NOTE — PROGRESS NOTES
PAT phone call for local procedure    Date/time/location (entrance C) of surgery/procedure verified with pt.  9/7/2023 @  [phone]

## 2023-09-07 ENCOUNTER — HOSPITAL ENCOUNTER (OUTPATIENT)
Age: 73
Setting detail: OUTPATIENT SURGERY
Discharge: HOME | End: 2023-09-07
Attending: ORTHOPAEDIC SURGERY | Admitting: ORTHOPAEDIC SURGERY
Payer: MEDICARE

## 2023-09-07 VITALS
SYSTOLIC BLOOD PRESSURE: 147 MMHG | TEMPERATURE: 97.1 F | HEIGHT: 68 IN | OXYGEN SATURATION: 96 % | BODY MASS INDEX: 47.74 KG/M2 | WEIGHT: 315 LBS | HEART RATE: 60 BPM | DIASTOLIC BLOOD PRESSURE: 62 MMHG | RESPIRATION RATE: 17 BRPM

## 2023-09-07 DIAGNOSIS — M65.341 TRIGGER RING FINGER OF RIGHT HAND: Primary | ICD-10-CM

## 2023-09-07 PROCEDURE — 3600000002 HC SURGERY LEVEL 2 BASE: Performed by: ORTHOPAEDIC SURGERY

## 2023-09-07 PROCEDURE — 2709999900 HC NON-CHARGEABLE SUPPLY: Performed by: ORTHOPAEDIC SURGERY

## 2023-09-07 PROCEDURE — 2500000003 HC RX 250 WO HCPCS: Performed by: ORTHOPAEDIC SURGERY

## 2023-09-07 PROCEDURE — 7100000011 HC PHASE II RECOVERY - ADDTL 15 MIN: Performed by: ORTHOPAEDIC SURGERY

## 2023-09-07 PROCEDURE — 26055 INCISE FINGER TENDON SHEATH: CPT | Performed by: ORTHOPAEDIC SURGERY

## 2023-09-07 PROCEDURE — 3600000012 HC SURGERY LEVEL 2 ADDTL 15MIN: Performed by: ORTHOPAEDIC SURGERY

## 2023-09-07 PROCEDURE — 7100000010 HC PHASE II RECOVERY - FIRST 15 MIN: Performed by: ORTHOPAEDIC SURGERY

## 2023-09-07 RX ORDER — TRAMADOL HYDROCHLORIDE 50 MG/1
50 TABLET ORAL EVERY 4 HOURS PRN
Qty: 20 TABLET | Refills: 0 | Status: SHIPPED | OUTPATIENT
Start: 2023-09-07 | End: 2023-09-14

## 2023-09-07 RX ORDER — LIDOCAINE HYDROCHLORIDE AND EPINEPHRINE 10; 10 MG/ML; UG/ML
INJECTION, SOLUTION INFILTRATION; PERINEURAL PRN
Status: DISCONTINUED | OUTPATIENT
Start: 2023-09-07 | End: 2023-09-07 | Stop reason: ALTCHOICE

## 2023-09-07 RX ORDER — PRASUGREL 10 MG/1
10 TABLET, FILM COATED ORAL DAILY
COMMUNITY

## 2023-09-07 ASSESSMENT — PAIN DESCRIPTION - PAIN TYPE: TYPE: CHRONIC PAIN

## 2023-09-07 ASSESSMENT — PAIN DESCRIPTION - LOCATION: LOCATION: BACK

## 2023-09-07 ASSESSMENT — PAIN DESCRIPTION - FREQUENCY: FREQUENCY: INTERMITTENT

## 2023-09-07 ASSESSMENT — PAIN SCALES - GENERAL
PAINLEVEL_OUTOF10: 0
PAINLEVEL_OUTOF10: 0

## 2023-09-07 ASSESSMENT — PAIN DESCRIPTION - DESCRIPTORS: DESCRIPTORS: ACHING

## 2023-09-07 NOTE — DISCHARGE INSTRUCTIONS
Activity-activity as tolerated with operative extremity    Dressing-change dressing after 2 days then okay to shower and get wet       Call  For fever >101, sweats, chills, redness,warmth, abnormal drainage from incision, increased pain not controlled with current methods, persistent nausea or vomiting

## 2023-09-07 NOTE — H&P
ORTHOPEDIC PATIENT EVALUATION      HPI / Chief Complaint  Jordan Avelar is a 71 y.o. male who presents for right ring finger trigger finger    Past Medical History  Yomaira Friedman  has a past medical history of Asthma, Atypical chest pain, Complete heart block ([address]), Diabetes mellitus ([address]), Frequent PVCs, Hyperlipidemia, Hypertension, LVH (left ventricular hypertrophy), Pacemaker, PONV (postoperative nausea and vomiting), Prostate CA ([address]), Sinus bradycardia, Tricuspid valve regurgitation, and Vertigo. Past Surgical History  Yomaira Friedman  has a past surgical history that includes joint replacement; back surgery; Cardiac catheterization; Finger trigger release; other surgical history; and Total hip arthroplasty. Current Medications  No current facility-administered medications for this encounter. Current Outpatient Medications   Medication Sig Dispense Refill    isosorbide mononitrate (IMDUR) 30 MG extended release tablet Take 30 mg by mouth daily      ticagrelor (BRILINTA) 90 MG TABS tablet Take 90 mg by mouth 2 times daily      allopurinol (ZYLOPRIM) 300 MG tablet Take 300 mg by mouth daily      amLODIPine (NORVASC) 10 MG tablet Take 10 mg by mouth daily      loratadine (CLARITIN) 10 MG capsule Take 10 mg by mouth daily      furosemide (LASIX) 40 MG tablet Take 40 mg by mouth daily      pantoprazole sodium (PROTONIX) 40 MG PACK packet Take 40 mg by mouth every morning (before breakfast)      aspirin 81 MG tablet Take 81 mg by mouth Take 2 daily      ammonium lactate (AMLACTIN) 12 % cream Apply topically      glipiZIDE (GLUCOTROL XL) 10 MG extended release tablet once daily Indications: 2 tabs. insulin lispro (HUMALOG) 100 UNIT/ML injection vial Indications: 38 units at breakfast and lunch, 52 units at supper.         insulin glargine (LANTUS) 100 UNIT/ML injection vial Inject into the skin      metFORMIN (GLUCOPHAGE-XR) 500 MG extended release tablet 1,000 mg      Prenatal Vit-Fe Fumarate-FA (M-VIT PO) Take 1

## 2023-09-07 NOTE — OP NOTE
Operative Note      Patient: Ella Van  YOB: 1950  MRN: [id number]    Date of Procedure: 9/7/2023    Pre-Op Diagnosis Codes:     * Trigger finger, right ring finger [M65.341]    Post-Op Diagnosis: Same       Procedure(s):  RIGHT RING FINGER A-1 PULLEY TRIGGER RELEASE    Surgeon(s):  Chiara Mehta MD    Assistant:   First Assistant: Juhi Granda    Anesthesia: Local    Estimated Blood Loss (mL): Minimal    Complications: None    Specimens:   * No specimens in log *    Implants:  * No implants in log *      Drains: * No LDAs found *    Findings: Trigger finger        Detailed Description of Procedure: This is a 71 y.o. male  with right ring finger trigger finger. Patient is aware of the risks and benefits of this procedure today and they have elected go ahead with this. Patient was brought to the operating room placed in the supine position. We then cleaned with Betadine solution over projected incision areas and injected local anesthetic with epi. We then prepped and draped the operative extremity in sterile fashion. Timeout was performed ensuring correct patient correct extremity and correct procedure. At this time I made an incision over the A1 pulley of the right ring finger. I then bluntly dissected down through subcu tissue. I placed my down curved retractors. Under direct visualization I sharply incised through the A1 pulley of the right ring finger. I then had the patient make a fist several times, they had good range of motion and no further triggering. We then closed with 4-0 monofilament subcuticular suture and skin glue. Sterile dressing was placed over top. Patient was then transferred to recovery in stable condition.      Electronically signed by Chiara Mehta MD on 9/7/2023 at 3:14 PM

## 2023-09-20 ENCOUNTER — OFFICE VISIT (OUTPATIENT)
Age: 73
End: 2023-09-20

## 2023-09-20 VITALS — BODY MASS INDEX: 47.74 KG/M2 | HEIGHT: 68 IN | WEIGHT: 315 LBS

## 2023-09-20 DIAGNOSIS — Z48.89 POSTOPERATIVE VISIT: Primary | ICD-10-CM

## 2023-09-20 DIAGNOSIS — M65.321 TRIGGER INDEX FINGER OF RIGHT HAND: ICD-10-CM

## 2023-09-20 RX ORDER — METHYLPREDNISOLONE ACETATE 40 MG/ML
40 INJECTION, SUSPENSION INTRA-ARTICULAR; INTRALESIONAL; INTRAMUSCULAR; SOFT TISSUE ONCE
Status: COMPLETED | OUTPATIENT
Start: 2023-09-20 | End: 2023-09-20

## 2023-09-20 RX ORDER — LIDOCAINE HYDROCHLORIDE 10 MG/ML
1 INJECTION, SOLUTION INFILTRATION; PERINEURAL ONCE
Status: COMPLETED | OUTPATIENT
Start: 2023-09-20 | End: 2023-09-20

## 2023-09-20 RX ADMIN — LIDOCAINE HYDROCHLORIDE 1 ML: 10 INJECTION, SOLUTION INFILTRATION; PERINEURAL at 12:29

## 2023-09-20 RX ADMIN — METHYLPREDNISOLONE ACETATE 40 MG: 40 INJECTION, SUSPENSION INTRA-ARTICULAR; INTRALESIONAL; INTRAMUSCULAR; SOFT TISSUE at 12:29

## 2023-09-20 NOTE — PROGRESS NOTES
[facility]  [facility], [facility] AND SPORTS MEDICINE  [address] [place] [id number]     Surgery:    9/7/2023  Right Ring Finger A-1 Pulley Trigger Release - Right    History of Present Illness: This is a 71 y.o. male who presents to the clinic today for post op follow up for Right Ring Finger A-1 Pulley Trigger Release - Right on 9/7/2023 . He is doing well postoperatively. He states he has no further triggering. However he does state that his right index finger has started to trigger. On examination the ring finger A1 pulley incision is healing nicely he has full range of motion of the digit. He is tender palpation over the right index finger A1 pulley does have some triggering today. At this point we taught him some scar massage to the ring finger and a gentle stretching routine. As far as the right side index finger goes he does have a trigger finger there we will do a steroid injection. I cleaned of the right index finger A1 pulley injected 40 mg Depo-Medrol and lidocaine solution. Risks including elevated blood sugars pain and infection were discussed with him. If this does not improve the next several weeks he can call and let us know. We explained him expected course of recovery for his long finger A1 pulley release.           Electronically signed by Hilda Herman MD on 9/20/2023 at 10:59 AM

## 2023-09-20 NOTE — PROGRESS NOTES
Administrations This Visit       lidocaine 1 % injection 1 mL       Admin Date  09/20/2023  12:29 Action  Given Dose  1 mL Route  Intra-artICUlar Site  Hand Right Administered By  Milka Adams MA    Ordering Provider: Dann Peterson MD    [facility] [address]: [id number]    Lot#: [id number]    : [address]    Patient Supplied?: No              methylPREDNISolone acetate (DEPO-MEDROL) injection 40 mg       Admin Date  09/20/2023  12:29 Action  Given Dose  40 mg Route  Intra-artICUlar Site  Hand Right Administered By  Milka Adams MA    Ordering Provider: Dann Peterson MD    NDC: 1011-0149-17    Lot#: [id number]    : [place]. Patient Supplied?: No                    Medication was administered by provider, Dr Eugene Abarca. No adverse reactions.     Milka Adams MA.

## 2024-01-09 ENCOUNTER — HOSPITAL ENCOUNTER (OUTPATIENT)
Dept: ULTRASOUND IMAGING | Age: 74
Discharge: HOME | End: 2024-01-11
Attending: ORTHOPAEDIC SURGERY
Payer: MEDICARE

## 2024-01-09 DIAGNOSIS — M79.602 LEFT ARM PAIN: ICD-10-CM

## 2024-01-09 PROCEDURE — 76881 US COMPL JOINT R-T W/IMG: CPT

## 2024-01-10 ENCOUNTER — TELEPHONE (OUTPATIENT)
Age: 74
End: 2024-01-10

## 2024-01-10 NOTE — TELEPHONE ENCOUNTER
Results of US reviewed by Dr. Sousa, advises: Biceps intact. Complete physical therapy for treatment and follow up afterwards IF still having issues. -- Called and spoke with patient, relayed information and he understands. Scheduled to start PT this week, will call back after if not improved to make follow up.

## 2024-01-18 ENCOUNTER — TELEPHONE (OUTPATIENT)
Age: 74
End: 2024-01-18

## 2024-01-18 DIAGNOSIS — M25.521 RIGHT ELBOW PAIN: Primary | ICD-10-CM

## 2024-03-26 ENCOUNTER — OFFICE VISIT (OUTPATIENT)
Age: 74
End: 2024-03-26
Payer: MEDICARE

## 2024-03-26 VITALS — HEIGHT: 68 IN | WEIGHT: 315 LBS | BODY MASS INDEX: 47.74 KG/M2

## 2024-03-26 DIAGNOSIS — M25.511 RIGHT SHOULDER PAIN, UNSPECIFIED CHRONICITY: Primary | ICD-10-CM

## 2024-03-26 PROCEDURE — 99214 OFFICE O/P EST MOD 30 MIN: CPT | Performed by: ORTHOPAEDIC SURGERY

## 2024-03-26 PROCEDURE — 1123F ACP DISCUSS/DSCN MKR DOCD: CPT | Performed by: ORTHOPAEDIC SURGERY

## 2024-03-26 NOTE — PROGRESS NOTES
PACK packet, Take 1 packet by mouth every morning (before breakfast), Disp: , Rfl:     aspirin 81 MG tablet, Take 1 tablet by mouth Take 2 daily, Disp: , Rfl:     ammonium lactate (AMLACTIN) 12 % cream, Apply topically, Disp: , Rfl:     glipiZIDE (GLUCOTROL XL) 10 MG extended release tablet, once daily Indications: 2 tabs.  , Disp: , Rfl:     insulin lispro (HUMALOG) 100 UNIT/ML injection vial, Indications: 38 units at breakfast and lunch, 52 units at supper.  , Disp: , Rfl:     insulin glargine (LANTUS) 100 UNIT/ML injection vial, Inject into the skin, Disp: , Rfl:     metFORMIN (GLUCOPHAGE-XR) 500 MG extended release tablet, 1,000 mg (Patient not taking: Reported on 9/7/2023), Disp: , Rfl:     Prenatal Vit-Fe Fumarate-FA (M-VIT PO), Take 1 tablet by mouth, Disp: , Rfl:     aliskiren (TEKTURNA) 150 MG tablet, TAKE 1 TABLET DAILY, Disp: , Rfl:     acetaminophen (TYLENOL) 500 MG tablet, Take 2 tablets by mouth 2 times daily Indications: Breakfast and Dinner, Disp: , Rfl:   Allergies   Allergen Reactions    Ace Inhibitors     Augmentin [Amoxicillin-Pot Clavulanate]     Byetta 10 Mcg Pen [Exenatide]     Ciprofloxacin     Erythromycin     Invokana [Canagliflozin]     Statins     Other Rash     chlorohexadine     Social History     Socioeconomic History    Marital status:      Spouse name: Not on file    Number of children: Not on file    Years of education: Not on file    Highest education level: Not on file   Occupational History    Occupation: retired   Tobacco Use    Smoking status: Never    Smokeless tobacco: Never   Vaping Use    Vaping Use: Never used   Substance and Sexual Activity    Alcohol use: No    Drug use: No    Sexual activity: Not on file   Other Topics Concern    Not on file   Social History Narrative    Patient lives with her  and has minimal exercise.  He does not smoke or drink alcohol.  Patient tried to decrease weight, but is having a difficult time managing his weight loss.

## 2024-04-15 ENCOUNTER — HOSPITAL ENCOUNTER (OUTPATIENT)
Dept: CT IMAGING | Age: 74
Discharge: HOME | End: 2024-04-17
Payer: MEDICARE

## 2024-04-15 ENCOUNTER — HOSPITAL ENCOUNTER (OUTPATIENT)
Dept: INTERVENTIONAL RADIOLOGY/VASCULAR | Age: 74
Discharge: HOME | End: 2024-04-17
Payer: MEDICARE

## 2024-04-15 DIAGNOSIS — M25.511 RIGHT SHOULDER PAIN, UNSPECIFIED CHRONICITY: ICD-10-CM

## 2024-04-15 PROCEDURE — 2709999900 IR ARTHR/ASP/INJ MAJOR JT/BURSA RIGHT WO US

## 2024-04-15 PROCEDURE — 6360000004 HC RX CONTRAST MEDICATION: Performed by: RADIOLOGY

## 2024-04-15 PROCEDURE — 73201 CT UPPER EXTREMITY W/DYE: CPT

## 2024-04-15 PROCEDURE — 77002 NEEDLE LOCALIZATION BY XRAY: CPT

## 2024-04-15 PROCEDURE — 23350 INJECTION FOR SHOULDER X-RAY: CPT

## 2024-04-15 RX ADMIN — IOPAMIDOL 7.5 ML: 612 INJECTION, SOLUTION INTRAVENOUS at 08:59

## 2024-04-15 NOTE — BRIEF OP NOTE
Brief Postoperative Note      Patient: Adan Fuller  YOB: 1950  MRN: [id number]    Date of Procedure: 4/15/2024    Pre-Op Diagnosis: R shoulder pain     Post-Op Diagnosis: same    Procedure: R shoulder arthrogram for CT       Performing: Vinh Matthew MD    Anesthesia: local    Estimated Blood Loss (mL): Minimal    Complications: None    Specimens: None     Findings: successful R shoulder arthrogram      Electronically signed by VINH MATTHEW MD on 4/15/2024 at 9:39 AM

## 2024-04-24 ENCOUNTER — OFFICE VISIT (OUTPATIENT)
Age: 74
End: 2024-04-24
Payer: MEDICARE

## 2024-04-24 VITALS — WEIGHT: 315 LBS | BODY MASS INDEX: 47.74 KG/M2 | HEIGHT: 68 IN

## 2024-04-24 DIAGNOSIS — M25.511 RIGHT SHOULDER PAIN, UNSPECIFIED CHRONICITY: Primary | ICD-10-CM

## 2024-04-24 PROCEDURE — 99214 OFFICE O/P EST MOD 30 MIN: CPT | Performed by: ORTHOPAEDIC SURGERY

## 2024-04-24 PROCEDURE — 1123F ACP DISCUSS/DSCN MKR DOCD: CPT | Performed by: ORTHOPAEDIC SURGERY

## 2024-04-24 NOTE — PROGRESS NOTES
[facility] Orthopedics & Sports Medicine      [facility] PHYSICIANS [facility]  [facility]THOPAEDICS AND SPORTS MEDICINE  [address]  [place] [address]  Dept: [phone]  Dept Fax: [phone]    Chief Compliant:  Chief Complaint   Patient presents with    Follow-up     Discuss MRI - R shoulder        History of Present Illness:  This is a 73 y.o. male who presents to the clinic today for evaluation / follow up of right shoulder CT arthrogram.  He does have a pacemaker so he was not able to get an MRI.  He continues to have pain throughout the day in this right shoulder.  He has had a previous rotator cuff repair in approximately 2009.  He had this pain for years with gotten worse even in the last 6 months.  He has done physical therapy..       Physical Exam:    On exam today can raise the arm up to pry about 150 degrees of forward elevation.  Passively I can take him up all the way.  He has tenderness over the AC joint and pain with cross body adduction test.  Reproduced at the AC joint.  Positive Neer's and Limon sign.  Some minor tenderness over the biceps tendon.    Nursing note and vitals reviewed.     Labs and Imaging: CT arthrogram of the right shoulder shows potentially a small pinhole full-thickness cuff tear anterior edge of supraspinatus.  Partial cuff tear is present as well    XR taken today:  No results found.      No orders of the defined types were placed in this encounter.      Assessment and Plan:  No diagnosis found.      This is a 73 y.o. male with right shoulder possible full-thickness rotator cuff tear, possible biceps tendinitis and AC joint arthritis.  I discussed with him that certainly a CT arthrogram is not the gold standard test but does give us an idea if there could be a rotator cuff tear here.  At this point we discussed the treatment options.  He is already done therapy and his pain is getting worse over time.  At this point we

## 2024-06-10 ENCOUNTER — TELEPHONE (OUTPATIENT)
Age: 74
End: 2024-06-10

## 2024-06-10 NOTE — TELEPHONE ENCOUNTER
Adan has been scheduled for sx on 7/1.  Instructions were given to him at the time of booking.  I called today to confirm date/time.  He had a heart ablation 1 week ago with [employer] but yet Epic is showing his most recent labs are from February.  I asked if he had labs prior to his ablation and he said yes he did in the last week of May.  While on the phone I also scheduled his post op appointment.  [facility] from [facility].  She is going to fax them to me.  I have also sent a cardiac clearance request to Dr. Martinez which includes anti-coagulation instructions request, sent using Epic.

## 2024-06-24 RX ORDER — CLOPIDOGREL BISULFATE 75 MG/1
75 TABLET ORAL DAILY
COMMUNITY

## 2024-06-24 RX ORDER — METOPROLOL SUCCINATE 25 MG/1
25 TABLET, EXTENDED RELEASE ORAL DAILY
COMMUNITY

## 2024-07-17 NOTE — H&P
ORTHOPEDIC PATIENT EVALUATION      HPI / Chief Complaint  Adan Fuller is a 73 y.o. male who presents for right shoulder rotator cuff tear, biceps tendinitis, and AC joint arthritis.    Past Medical History  Adan  has a past medical history of Asthma, Atypical chest pain, Complete heart block (HCC), COVID, Diabetes mellitus (HCC), Frequent PVCs, Hyperlipidemia, Hypertension, LVH (left ventricular hypertrophy), Pacemaker, PONV (postoperative nausea and vomiting), Prolonged emergence from general anesthesia, Prostate CA (HCC), Sinus bradycardia, Tricuspid valve regurgitation, and Vertigo.    Past Surgical History  Adan  has a past surgical history that includes joint replacement; back surgery; Cardiac catheterization (2022); Finger trigger release (Bilateral); other surgical history; Total hip arthroplasty (Bilateral); Finger trigger release (Right, 09/07/2023); Finger trigger release (Right, 09/07/2023); ablation of dysrhythmic focus; Coronary angioplasty with stent; pacemaker placement (07/2022); Colonoscopy; Tonsillectomy; Appendectomy; eye surgery (Bilateral); hernia repair; Knee Arthroplasty (Left); hernia repair (2005); and Shoulder arthroscopy (Bilateral).    Current Medications  No current facility-administered medications for this encounter.     Current Outpatient Medications   Medication Sig Dispense Refill    clopidogrel (PLAVIX) 75 MG tablet Take 1 tablet by mouth daily      metoprolol succinate (TOPROL XL) 25 MG extended release tablet Take 1 tablet by mouth daily      Evolocumab (REPATHA SC) Inject into the skin Every other monday      allopurinol (ZYLOPRIM) 300 MG tablet Take 1 tablet by mouth daily      amLODIPine (NORVASC) 10 MG tablet Take 0.5 tablets by mouth daily      loratadine (CLARITIN) 10 MG capsule Take 1 capsule by mouth daily      furosemide (LASIX) 40 MG tablet Take 1 tablet by mouth daily      pantoprazole sodium (PROTONIX) 40 MG PACK packet Take 1 packet by mouth every morning

## 2024-07-19 ENCOUNTER — ANESTHESIA EVENT (OUTPATIENT)
Dept: OPERATING ROOM | Age: 74
End: 2024-07-19
Payer: MEDICARE

## 2024-07-21 NOTE — ANESTHESIA PRE PROCEDURE
BMI:   Wt Readings from Last 3 Encounters:   04/24/24 (!) 144.2 kg (318 lb)   03/26/24 (!) 144.2 kg (318 lb)   12/19/23 (!) 144.7 kg (319 lb)     Body mass index is 47.74 kg/m².    CBC:   Lab Results   Component Value Date/Time    WBC 8.4 09/02/2022 01:00 AM    RBC 4.39 09/02/2022 01:00 AM    HGB 12.5 09/02/2022 01:00 AM    HCT 38.8 09/02/2022 01:00 AM    MCV 88.4 09/02/2022 01:00 AM    RDW 15.2 09/02/2022 01:00 AM     09/02/2022 01:00 AM       CMP:   Lab Results   Component Value Date/Time     09/02/2022 01:00 AM    K 3.4 09/02/2022 01:00 AM     09/02/2022 01:00 AM    CO2 27 09/02/2022 01:00 AM    BUN 22 09/02/2022 01:00 AM    CREATININE 0.90 09/02/2022 01:00 AM    GFRAA >60 09/02/2022 01:00 AM    LABGLOM >60 09/02/2022 01:00 AM    GLUCOSE 72 09/02/2022 01:00 AM    CALCIUM 9.4 09/02/2022 01:00 AM       POC Tests: No results for input(s): \"POCGLU\", \"POCNA\", \"POCK\", \"POCCL\", \"POCBUN\", \"POCHEMO\", \"POCHCT\" in the last 72 hours.    Coags: No results found for: \"PROTIME\", \"INR\", \"APTT\"    HCG (If Applicable): No results found for: \"PREGTESTUR\", \"PREGSERUM\", \"HCG\", \"HCGQUANT\"     ABGs: No results found for: \"PHART\", \"PO2ART\", \"YXO4ZKD\", \"TDB6DUV\", \"BEART\", \"N0VSAZHM\"     Type & Screen (If Applicable):  No results found for: \"LABABO\"    Drug/Infectious Status (If Applicable):  No results found for: \"HIV\", \"HEPCAB\"    COVID-19 Screening (If Applicable): No results found for: \"COVID19\"        Anesthesia Evaluation  Patient summary reviewed and Nursing notes reviewed   history of anesthetic complications: PONV.  Airway: Mallampati: III  TM distance: >3 FB   Neck ROM: limited  Mouth opening: > = 3 FB   Dental: normal exam         Pulmonary:normal exam  breath sounds clear to auscultation  (+)     sleep apnea:       asthma:                            Cardiovascular:  Exercise tolerance: no interval change  (+) hypertension:, pacemaker: pacemaker,

## 2024-07-22 ENCOUNTER — ANESTHESIA (OUTPATIENT)
Dept: OPERATING ROOM | Age: 74
End: 2024-07-22
Payer: MEDICARE

## 2024-07-22 ENCOUNTER — HOSPITAL ENCOUNTER (OUTPATIENT)
Age: 74
Setting detail: OUTPATIENT SURGERY
Discharge: HOME | End: 2024-07-22
Attending: ORTHOPAEDIC SURGERY | Admitting: ORTHOPAEDIC SURGERY
Payer: MEDICARE

## 2024-07-22 VITALS
HEART RATE: 60 BPM | HEIGHT: 68 IN | OXYGEN SATURATION: 88 % | SYSTOLIC BLOOD PRESSURE: 144 MMHG | BODY MASS INDEX: 47.74 KG/M2 | TEMPERATURE: 96.5 F | RESPIRATION RATE: 18 BRPM | WEIGHT: 315 LBS | DIASTOLIC BLOOD PRESSURE: 65 MMHG

## 2024-07-22 DIAGNOSIS — G89.18 POST-OP PAIN: Primary | ICD-10-CM

## 2024-07-22 LAB
GLUCOSE BLD-MCNC: 158 MG/DL (ref 75–110)
GLUCOSE BLD-MCNC: 172 MG/DL (ref 75–110)

## 2024-07-22 PROCEDURE — 7100000000 HC PACU RECOVERY - FIRST 15 MIN: Performed by: ORTHOPAEDIC SURGERY

## 2024-07-22 PROCEDURE — 6360000002 HC RX W HCPCS: Performed by: ANESTHESIOLOGY

## 2024-07-22 PROCEDURE — 6360000002 HC RX W HCPCS

## 2024-07-22 PROCEDURE — C9290 INJ, BUPIVACAINE LIPOSOME: HCPCS | Performed by: ANESTHESIOLOGY

## 2024-07-22 PROCEDURE — 3700000001 HC ADD 15 MINUTES (ANESTHESIA): Performed by: ORTHOPAEDIC SURGERY

## 2024-07-22 PROCEDURE — 6360000002 HC RX W HCPCS: Performed by: SPECIALIST

## 2024-07-22 PROCEDURE — 3600000014 HC SURGERY LEVEL 4 ADDTL 15MIN: Performed by: ORTHOPAEDIC SURGERY

## 2024-07-22 PROCEDURE — 2709999900 HC NON-CHARGEABLE SUPPLY: Performed by: ORTHOPAEDIC SURGERY

## 2024-07-22 PROCEDURE — 64415 NJX AA&/STRD BRCH PLXS IMG: CPT | Performed by: ANESTHESIOLOGY

## 2024-07-22 PROCEDURE — 6360000002 HC RX W HCPCS: Performed by: ORTHOPAEDIC SURGERY

## 2024-07-22 PROCEDURE — 2720000010 HC SURG SUPPLY STERILE: Performed by: ORTHOPAEDIC SURGERY

## 2024-07-22 PROCEDURE — 2580000003 HC RX 258: Performed by: ORTHOPAEDIC SURGERY

## 2024-07-22 PROCEDURE — 2580000003 HC RX 258: Performed by: STUDENT IN AN ORGANIZED HEALTH CARE EDUCATION/TRAINING PROGRAM

## 2024-07-22 PROCEDURE — 7100000001 HC PACU RECOVERY - ADDTL 15 MIN: Performed by: ORTHOPAEDIC SURGERY

## 2024-07-22 PROCEDURE — 3700000000 HC ANESTHESIA ATTENDED CARE: Performed by: ORTHOPAEDIC SURGERY

## 2024-07-22 PROCEDURE — 2500000003 HC RX 250 WO HCPCS: Performed by: SPECIALIST

## 2024-07-22 PROCEDURE — 3600000004 HC SURGERY LEVEL 4 BASE: Performed by: ORTHOPAEDIC SURGERY

## 2024-07-22 PROCEDURE — 7100000010 HC PHASE II RECOVERY - FIRST 15 MIN: Performed by: ORTHOPAEDIC SURGERY

## 2024-07-22 PROCEDURE — L3650 SO 8 ABD RESTRAINT PRE OTS: HCPCS | Performed by: ORTHOPAEDIC SURGERY

## 2024-07-22 PROCEDURE — 6370000000 HC RX 637 (ALT 250 FOR IP)

## 2024-07-22 PROCEDURE — 7100000011 HC PHASE II RECOVERY - ADDTL 15 MIN: Performed by: ORTHOPAEDIC SURGERY

## 2024-07-22 PROCEDURE — 82947 ASSAY GLUCOSE BLOOD QUANT: CPT

## 2024-07-22 RX ORDER — IBUPROFEN 800 MG/1
800 TABLET ORAL EVERY 8 HOURS PRN
Qty: 90 TABLET | Refills: 0 | Status: SHIPPED | OUTPATIENT
Start: 2024-07-22

## 2024-07-22 RX ORDER — SODIUM CHLORIDE 0.9 % (FLUSH) 0.9 %
5-40 SYRINGE (ML) INJECTION EVERY 12 HOURS SCHEDULED
Status: DISCONTINUED | OUTPATIENT
Start: 2024-07-22 | End: 2024-07-22 | Stop reason: HOSPADM

## 2024-07-22 RX ORDER — DIPHENHYDRAMINE HYDROCHLORIDE 50 MG/ML
12.5 INJECTION INTRAMUSCULAR; INTRAVENOUS
Status: DISCONTINUED | OUTPATIENT
Start: 2024-07-22 | End: 2024-07-22 | Stop reason: HOSPADM

## 2024-07-22 RX ORDER — CEPHALEXIN 500 MG/1
500 CAPSULE ORAL 2 TIMES DAILY
Qty: 10 CAPSULE | Refills: 0 | OUTPATIENT
Start: 2024-07-22 | End: 2024-07-27

## 2024-07-22 RX ORDER — DEXAMETHASONE SODIUM PHOSPHATE 10 MG/ML
INJECTION, SOLUTION INTRAMUSCULAR; INTRAVENOUS PRN
Status: DISCONTINUED | OUTPATIENT
Start: 2024-07-22 | End: 2024-07-22 | Stop reason: SDUPTHER

## 2024-07-22 RX ORDER — MIDAZOLAM HYDROCHLORIDE 2 MG/2ML
2 INJECTION, SOLUTION INTRAMUSCULAR; INTRAVENOUS
Status: COMPLETED | OUTPATIENT
Start: 2024-07-22 | End: 2024-07-22

## 2024-07-22 RX ORDER — ROCURONIUM BROMIDE 10 MG/ML
INJECTION, SOLUTION INTRAVENOUS PRN
Status: DISCONTINUED | OUTPATIENT
Start: 2024-07-22 | End: 2024-07-22 | Stop reason: SDUPTHER

## 2024-07-22 RX ORDER — CEPHALEXIN 500 MG/1
500 CAPSULE ORAL 2 TIMES DAILY
Qty: 10 CAPSULE | Refills: 0 | Status: SHIPPED | OUTPATIENT
Start: 2024-07-22 | End: 2024-07-27

## 2024-07-22 RX ORDER — OXYCODONE HYDROCHLORIDE AND ACETAMINOPHEN 5; 325 MG/1; MG/1
1-2 TABLET ORAL EVERY 6 HOURS PRN
Qty: 30 TABLET | Refills: 0 | OUTPATIENT
Start: 2024-07-22 | End: 2024-07-29

## 2024-07-22 RX ORDER — LIDOCAINE HYDROCHLORIDE 10 MG/ML
INJECTION, SOLUTION INFILTRATION; PERINEURAL PRN
Status: DISCONTINUED | OUTPATIENT
Start: 2024-07-22 | End: 2024-07-22 | Stop reason: SDUPTHER

## 2024-07-22 RX ORDER — METOCLOPRAMIDE HYDROCHLORIDE 5 MG/ML
10 INJECTION INTRAMUSCULAR; INTRAVENOUS
Status: DISCONTINUED | OUTPATIENT
Start: 2024-07-22 | End: 2024-07-22 | Stop reason: HOSPADM

## 2024-07-22 RX ORDER — SODIUM CHLORIDE 9 MG/ML
INJECTION, SOLUTION INTRAVENOUS PRN
Status: DISCONTINUED | OUTPATIENT
Start: 2024-07-22 | End: 2024-07-22 | Stop reason: HOSPADM

## 2024-07-22 RX ORDER — PROPOFOL 10 MG/ML
INJECTION, EMULSION INTRAVENOUS PRN
Status: DISCONTINUED | OUTPATIENT
Start: 2024-07-22 | End: 2024-07-22 | Stop reason: SDUPTHER

## 2024-07-22 RX ORDER — SODIUM CHLORIDE 0.9 % (FLUSH) 0.9 %
5-40 SYRINGE (ML) INJECTION PRN
Status: DISCONTINUED | OUTPATIENT
Start: 2024-07-22 | End: 2024-07-22 | Stop reason: HOSPADM

## 2024-07-22 RX ORDER — FENTANYL CITRATE 50 UG/ML
100 INJECTION, SOLUTION INTRAMUSCULAR; INTRAVENOUS
Status: COMPLETED | OUTPATIENT
Start: 2024-07-22 | End: 2024-07-22

## 2024-07-22 RX ORDER — NALOXONE HYDROCHLORIDE 0.4 MG/ML
INJECTION, SOLUTION INTRAMUSCULAR; INTRAVENOUS; SUBCUTANEOUS PRN
Status: DISCONTINUED | OUTPATIENT
Start: 2024-07-22 | End: 2024-07-22 | Stop reason: HOSPADM

## 2024-07-22 RX ORDER — FENTANYL CITRATE 50 UG/ML
INJECTION, SOLUTION INTRAMUSCULAR; INTRAVENOUS
Status: COMPLETED
Start: 2024-07-22 | End: 2024-07-22

## 2024-07-22 RX ORDER — LABETALOL HYDROCHLORIDE 5 MG/ML
10 INJECTION, SOLUTION INTRAVENOUS
Status: DISCONTINUED | OUTPATIENT
Start: 2024-07-22 | End: 2024-07-22 | Stop reason: HOSPADM

## 2024-07-22 RX ORDER — OXYCODONE HYDROCHLORIDE 5 MG/1
5 TABLET ORAL PRN
Status: DISCONTINUED | OUTPATIENT
Start: 2024-07-22 | End: 2024-07-22 | Stop reason: HOSPADM

## 2024-07-22 RX ORDER — MIDAZOLAM HYDROCHLORIDE 1 MG/ML
INJECTION INTRAMUSCULAR; INTRAVENOUS
Status: COMPLETED
Start: 2024-07-22 | End: 2024-07-22

## 2024-07-22 RX ORDER — HYDRALAZINE HYDROCHLORIDE 20 MG/ML
10 INJECTION INTRAMUSCULAR; INTRAVENOUS
Status: DISCONTINUED | OUTPATIENT
Start: 2024-07-22 | End: 2024-07-22 | Stop reason: HOSPADM

## 2024-07-22 RX ORDER — ACETAMINOPHEN 500 MG
1000 TABLET ORAL ONCE
Status: DISCONTINUED | OUTPATIENT
Start: 2024-07-22 | End: 2024-07-22 | Stop reason: HOSPADM

## 2024-07-22 RX ORDER — MEPERIDINE HYDROCHLORIDE 50 MG/ML
12.5 INJECTION INTRAMUSCULAR; INTRAVENOUS; SUBCUTANEOUS ONCE
Status: DISCONTINUED | OUTPATIENT
Start: 2024-07-22 | End: 2024-07-22 | Stop reason: HOSPADM

## 2024-07-22 RX ORDER — OXYCODONE HYDROCHLORIDE AND ACETAMINOPHEN 5; 325 MG/1; MG/1
1-2 TABLET ORAL EVERY 6 HOURS PRN
Qty: 30 TABLET | Refills: 0 | Status: SHIPPED | OUTPATIENT
Start: 2024-07-22 | End: 2024-07-29

## 2024-07-22 RX ORDER — BUPIVACAINE HYDROCHLORIDE 2.5 MG/ML
INJECTION, SOLUTION INFILTRATION; PERINEURAL
Status: COMPLETED | OUTPATIENT
Start: 2024-07-22 | End: 2024-07-22

## 2024-07-22 RX ORDER — PHENYLEPHRINE HCL IN 0.9% NACL 1 MG/10 ML
SYRINGE (ML) INTRAVENOUS PRN
Status: DISCONTINUED | OUTPATIENT
Start: 2024-07-22 | End: 2024-07-22 | Stop reason: SDUPTHER

## 2024-07-22 RX ORDER — ONDANSETRON 2 MG/ML
4 INJECTION INTRAMUSCULAR; INTRAVENOUS
Status: DISCONTINUED | OUTPATIENT
Start: 2024-07-22 | End: 2024-07-22 | Stop reason: HOSPADM

## 2024-07-22 RX ORDER — OXYCODONE HYDROCHLORIDE 5 MG/1
10 TABLET ORAL PRN
Status: DISCONTINUED | OUTPATIENT
Start: 2024-07-22 | End: 2024-07-22 | Stop reason: HOSPADM

## 2024-07-22 RX ORDER — LIDOCAINE HYDROCHLORIDE 10 MG/ML
1 INJECTION, SOLUTION EPIDURAL; INFILTRATION; INTRACAUDAL; PERINEURAL
Status: DISCONTINUED | OUTPATIENT
Start: 2024-07-22 | End: 2024-07-22 | Stop reason: HOSPADM

## 2024-07-22 RX ORDER — MIDAZOLAM HYDROCHLORIDE 2 MG/2ML
2 INJECTION, SOLUTION INTRAMUSCULAR; INTRAVENOUS
Status: DISCONTINUED | OUTPATIENT
Start: 2024-07-22 | End: 2024-07-22 | Stop reason: HOSPADM

## 2024-07-22 RX ORDER — BUPIVACAINE HYDROCHLORIDE 5 MG/ML
INJECTION, SOLUTION EPIDURAL; INTRACAUDAL
Status: COMPLETED | OUTPATIENT
Start: 2024-07-22 | End: 2024-07-22

## 2024-07-22 RX ORDER — IBUPROFEN 800 MG/1
800 TABLET ORAL EVERY 8 HOURS PRN
Qty: 90 TABLET | Refills: 0 | OUTPATIENT
Start: 2024-07-22

## 2024-07-22 RX ORDER — ACETAMINOPHEN 500 MG
TABLET ORAL
Status: COMPLETED
Start: 2024-07-22 | End: 2024-07-22

## 2024-07-22 RX ORDER — SODIUM CHLORIDE, SODIUM LACTATE, POTASSIUM CHLORIDE, CALCIUM CHLORIDE 600; 310; 30; 20 MG/100ML; MG/100ML; MG/100ML; MG/100ML
INJECTION, SOLUTION INTRAVENOUS CONTINUOUS
Status: DISCONTINUED | OUTPATIENT
Start: 2024-07-22 | End: 2024-07-22 | Stop reason: HOSPADM

## 2024-07-22 RX ORDER — ONDANSETRON 2 MG/ML
INJECTION INTRAMUSCULAR; INTRAVENOUS PRN
Status: DISCONTINUED | OUTPATIENT
Start: 2024-07-22 | End: 2024-07-22 | Stop reason: SDUPTHER

## 2024-07-22 RX ORDER — GLYCOPYRROLATE 0.2 MG/ML
INJECTION INTRAMUSCULAR; INTRAVENOUS PRN
Status: DISCONTINUED | OUTPATIENT
Start: 2024-07-22 | End: 2024-07-22 | Stop reason: SDUPTHER

## 2024-07-22 RX ADMIN — Medication 100 MCG: at 08:37

## 2024-07-22 RX ADMIN — ONDANSETRON 4 MG: 2 INJECTION INTRAMUSCULAR; INTRAVENOUS at 09:23

## 2024-07-22 RX ADMIN — SODIUM CHLORIDE: 9 INJECTION, SOLUTION INTRAVENOUS at 06:29

## 2024-07-22 RX ADMIN — LIDOCAINE HYDROCHLORIDE 40 MG: 10 INJECTION, SOLUTION INFILTRATION; PERINEURAL at 07:35

## 2024-07-22 RX ADMIN — FENTANYL CITRATE 100 MCG: 50 INJECTION, SOLUTION INTRAMUSCULAR; INTRAVENOUS at 06:55

## 2024-07-22 RX ADMIN — ROCURONIUM BROMIDE 50 MG: 10 INJECTION, SOLUTION INTRAVENOUS at 07:35

## 2024-07-22 RX ADMIN — BUPIVACAINE 10 ML: 13.3 INJECTION, SUSPENSION, LIPOSOMAL INFILTRATION at 07:01

## 2024-07-22 RX ADMIN — MIDAZOLAM HYDROCHLORIDE 2 MG: 1 INJECTION, SOLUTION INTRAMUSCULAR; INTRAVENOUS at 06:55

## 2024-07-22 RX ADMIN — GLYCOPYRROLATE 0.4 MG: 0.2 INJECTION INTRAMUSCULAR; INTRAVENOUS at 07:45

## 2024-07-22 RX ADMIN — BUPIVACAINE HYDROCHLORIDE 10 ML: 2.5 INJECTION, SOLUTION INFILTRATION; PERINEURAL at 07:01

## 2024-07-22 RX ADMIN — SUGAMMADEX 300 MG: 100 INJECTION, SOLUTION INTRAVENOUS at 09:44

## 2024-07-22 RX ADMIN — Medication 3000 MG: at 07:45

## 2024-07-22 RX ADMIN — DEXAMETHASONE SODIUM PHOSPHATE 10 MG: 10 INJECTION INTRAMUSCULAR; INTRAVENOUS at 07:45

## 2024-07-22 RX ADMIN — ROCURONIUM BROMIDE 20 MG: 10 INJECTION, SOLUTION INTRAVENOUS at 08:25

## 2024-07-22 RX ADMIN — ACETAMINOPHEN 1000 MG: 500 TABLET ORAL at 06:43

## 2024-07-22 RX ADMIN — BUPIVACAINE HYDROCHLORIDE 5 ML: 5 INJECTION, SOLUTION EPIDURAL; INTRACAUDAL; PERINEURAL at 07:01

## 2024-07-22 RX ADMIN — PROPOFOL 200 MG: 10 INJECTION, EMULSION INTRAVENOUS at 07:35

## 2024-07-22 RX ADMIN — MIDAZOLAM HYDROCHLORIDE 2 MG: 2 INJECTION, SOLUTION INTRAMUSCULAR; INTRAVENOUS at 06:55

## 2024-07-22 NOTE — OP NOTE
and tearing.  He had a very flattened and diseased biceps tendon.  We made an anterior working portal.  Became informed of biceps tenotomy.  I then brought her shaver and performed a labral debridement.  I also debrided the superior aspect of the subscap which was intact but had some fraying.  I then came up and identified a rotator cuff tear at the supraspinatus.    I then withdrew my arthroscopy equipment.  I came off the anterior lateral border the acromion for an incision.  Incised sharply through skin came down through subcu tissue.  Came down to the deltoid raphae.  Came down through subacromial bursa performing a bursectomy.  I then took my RAST performed a subacromial decompression.  He had previous sutures here from a rotator cuff repair which I removed.  We identified easily the rotator cuff tear.  It went from the bicipital groove going through the width of the supraspinatus.  I did have identified the biceps tendon in the groove and excise out little bit more of this to get this out of the groove.  I then used my rasp to do read the rotator cuff footprint.  I made 3 transosseous tunnels.  I had a total of 5 sutures coming out of these tunnels.  I then passed them through the rotator cuff in a grasping locking stitch nature.  This block for a very nice rotator cuff repair.  I then irrigated out.  I then mobilized my incision to come over the distal clavicle.    I did a subperiosteal dissection around the the distal clavicle.  I then with 2 retractors around it did distal clavicle excision with my saw.  I completed it with my osteotome.  I then remove this with a rongeur.  This left a nice gap here.  We then closed the capsule with 0 Vicryl suture after irrigating.  We then irrigated out a little bit more.  I closed the deltoid raphae with a #2 Ethibond suture.  And closed the skin with 3 oh subcu 3-0 monofilament subcuticular and skin glue.  Portal sites were closed with Prolene suture.  Sterile dressings

## 2024-07-22 NOTE — DISCHARGE INSTRUCTIONS
Activity-nonweightbearing right upper extremity.  Sling while up.  Okay to come out of sling while sitting.  No active elevation of the arm. Ok to start Pendulum exercises once nerve block worm off 2-3x/day. Formal therapy will be set up at a later date in office    Dressing-keep dressings clean and intact for 5 days.  Okay to shower on postoperative day 2 (7/24)no direct water to  dressing sites.---wash with gentle soap & water,no scrubbing incision, pat dry when done, no lotions or ointments to incision site; No soaking in bath tub, hot tub, or swimming until cleared by Dr. Anabell Berry For:  *Fever>101 or persistent low grade temp. Or other signs/symptoms of infection such as sweats/chills, pus-like or foul smelling drainage, redness warmth or excessive swelling  *Increased pain not controlled with current medications,repositioning,ice or elevation or any other comfort measures at home  *Persistent nausea or vomiting  *Excessive bleeding-saturating through dressing  *Color change in hand/fingers-pale or bluish in color, cold, new numbness or tingling    Activity  You have had anesthesia today  Do not drive, operate heavy equipment, consume alcoholic beverages, or make any important decisions  for 24 hours   If you are taking pain medication: Do not drive or consume alcohol.  Take your time changing positions today. You may feel light headed or dizzy if you move too quickly.   Continue your home medications as ordered by your physician.  Diet   You can eat your normal diet when you feel well. You should start off with bland foods like chicken soup, toast, or yogurt. Then advance as tolerated.  Drink plenty of fluids (unless your doctor tells you not to). Your urine should be very lightly colored without a strong odor.

## 2024-07-22 NOTE — ANESTHESIA POSTPROCEDURE EVALUATION
Department of Anesthesiology  Postprocedure Note    Patient: Adan Fuller  MRN: [id number]  YOB: 1950  Date of evaluation: 7/22/2024    Procedure Summary       Date: 07/22/24 Room / Location: [address]    Anesthesia Start: 0727 Anesthesia Stop: 1003    Procedure: RIGHT SHOULDER ARTHROSCOPY WITH OPEN DISTAL CLAVICLE EXCISION, BICEPS TENOTOMY AND OPEN ROTATOR CUFF REPAIR (Right: Shoulder) Diagnosis:       Tear of right rotator cuff, unspecified tear extent, unspecified whether traumatic      DJD of AC (acromioclavicular) joint      Biceps tendinitis, right      (Tear of right rotator cuff, unspecified tear extent, unspecified whether traumatic [M75.101])      (DJD of AC (acromioclavicular) joint [M19.019])      (Biceps tendinitis, right [M75.21])    Surgeons: Calin Sousa MD Responsible Provider: Lilli Cordero MD    Anesthesia Type: general ASA Status: 3            Anesthesia Type: No value filed.    Sharonda Phase I: Sharonda Score: 8    Sharonda Phase II:      Anesthesia Post Evaluation    Patient location during evaluation: PACU  Patient participation: complete - patient participated  Level of consciousness: awake and alert  Airway patency: patent  Nausea & Vomiting: no nausea and no vomiting  Cardiovascular status: hemodynamically stable  Respiratory status: room air and spontaneous ventilation  Hydration status: euvolemic  Multimodal analgesia pain management approach  Pain management: adequate    No notable events documented.

## 2024-07-22 NOTE — ANESTHESIA PROCEDURE NOTES
Peripheral Block    Patient location during procedure: pre-op  Reason for block: post-op pain management and at surgeon's request  Start time: 7/22/2024 7:01 AM  End time: 7/22/2024 7:03 AM  Staffing  Performed: anesthesiologist   Anesthesiologist: Lilli Cordero MD  Performed by: Lilli Cordero MD  Authorized by: Lilli Cordero MD    Preanesthetic Checklist  Completed: patient identified, IV checked, site marked, risks and benefits discussed, surgical/procedural consents, equipment checked, pre-op evaluation, timeout performed, anesthesia consent given, oxygen available, monitors applied/VS acknowledged, fire risk safety assessment completed and verbalized and blood product R/B/A discussed and consented  Peripheral Block   Patient position: supine  Prep: ChloraPrep  Provider prep: mask and sterile gloves  Patient monitoring: cardiac monitor, continuous pulse ox, frequent blood pressure checks, IV access, oxygen and responsive to questions  Block type: Brachial plexus  Interscalene  Laterality: right  Injection technique: single-shot  Guidance: ultrasound guided  Local infiltration: bupivacaine  Infiltration strength: 0.25 %  Local infiltration: bupivacaine  Dose: 2 mL    Needle   Needle type: insulated echogenic nerve stimulator needle   Needle gauge: 22 G  Needle localization: anatomical landmarks, ultrasound guidance and nerve stimulator  Needle insertion depth: 2 cm  Test dose: negative  Needle length: 5 cm  Assessment   Injection assessment: negative aspiration for heme, no paresthesia on injection, local visualized surrounding nerve on ultrasound and no intravascular symptoms  Paresthesia pain: none  Slow fractionated injection: yes  Hemodynamics: stable  Outcomes: uncomplicated and patient tolerated procedure well    Medications Administered  BUPivacaine (MARCAINE) PF injection 0.5% - Interscalene, Shoulder Right   5 mL - 7/22/2024 7:01:00 AM  BUPivacaine liposome (EXPAREL) injection 1.3% - Perineural, Shoulder Right   10

## 2024-07-25 ENCOUNTER — TELEPHONE (OUTPATIENT)
Age: 74
End: 2024-07-25

## 2024-07-25 NOTE — TELEPHONE ENCOUNTER
Patient called stating htat since his surgery on Monday 7/22/24, patient is having lower extremity swelling. Patient has little pain noted. Patient urged to elevated and help get the fluid out of the lower extremities. Patient to update if symptoms do not resolve or get worse

## 2024-07-26 NOTE — TELEPHONE ENCOUNTER
Patient called to update stating that he is continuing to have lower extremity swelling. Patient has taken a water pill but fluid is still accumulating. Ana Laura Darden PA to be made aware and patient to call his cardiologist to ensure they are aware of the added fluid and see how they would want to proceed. Patient does not routinely weigh himself daily to know what increased weight is but he will call cardiology to discuss

## 2024-08-07 ENCOUNTER — OFFICE VISIT (OUTPATIENT)
Age: 74
End: 2024-08-07

## 2024-08-07 VITALS — HEIGHT: 68 IN | WEIGHT: 315 LBS | BODY MASS INDEX: 47.74 KG/M2

## 2024-08-07 DIAGNOSIS — S46.212A RUPTURE OF LEFT DISTAL BICEPS TENDON, INITIAL ENCOUNTER: Primary | ICD-10-CM

## 2024-08-07 PROCEDURE — 99024 POSTOP FOLLOW-UP VISIT: CPT

## 2024-08-07 RX ORDER — TRIAMCINOLONE ACETONIDE 0.25 MG/G
OINTMENT TOPICAL
Qty: 1 EACH | Refills: 0 | Status: SHIPPED | OUTPATIENT
Start: 2024-08-07 | End: 2024-08-14

## 2024-08-07 NOTE — PROGRESS NOTES
[facility] Orthopedics & Sports Medicine    [facility] Orthopaedics and Sports Medicine  [address] [place] [address], [address]  [place] [address]    Surgery:    7/22/2024  Right Shoulder Arthroscopy With Open Distal Clavicle Excision, Biceps Tenotomy And Open Rotator Cuff Repair - Right    History of Present Illness:    This is a 73 y.o. male who presents to the clinic today for post op follow up for Right Shoulder Arthroscopy With Open Distal Clavicle Excision, Biceps Tenotomy And Open Rotator Cuff Repair - Right on 7/22/2024 .  Patient states he is doing well postoperative.  He has not been taking any medications for pain.  He states he has been very comfortable overall.  He states the only concern that he has at this point is the fact that he has had quite a bit of itchiness around his incision sites as well as around the anterior aspect of his upper arm.  He states he has been taking Benadryl for this as well as using hydrocortisone cream.  He presents today for further evaluation.    Physical exam:  Right shoulder: I can passively bring the patient shoulder up to about 140 degrees of forward flexion.  His incision sites appear to be healing well at this point.  There is no seepage or drainage coming from the incision sites.  There is no erythema around the incision sites.  No signs infectious process ongoing.  He does have tiny red papules around the incision sites as well as into the anterior aspect of his upper arm indicating allergic reaction.    Plan:  Patient is doing well postoperative.  At this point I am going to prescribe him a triamcinolone cream to help with the allergic reaction/rash that he has going on around his incision sites.  If he is not having any relief with this, he should let us know.  At that point we can consider a Medrol Dosepak.  I do want to avoid a Medrol Dosepak today as the patient does have history of diabetes.  Regarding pain, if he is having pain he can take Tylenol or

## 2024-09-04 ENCOUNTER — OFFICE VISIT (OUTPATIENT)
Age: 74
End: 2024-09-04

## 2024-09-04 VITALS — BODY MASS INDEX: 47.74 KG/M2 | WEIGHT: 315 LBS | HEIGHT: 68 IN

## 2024-09-04 DIAGNOSIS — Z98.890 S/P RIGHT ROTATOR CUFF REPAIR: Primary | ICD-10-CM

## 2024-09-04 RX ADMIN — METHYLPREDNISOLONE ACETATE 40 MG: 80 INJECTION, SUSPENSION INTRA-ARTICULAR; INTRALESIONAL; INTRAMUSCULAR; SOFT TISSUE at 10:19

## 2024-09-04 RX ADMIN — LIDOCAINE HYDROCHLORIDE 0.5 ML: 10 INJECTION, SOLUTION INFILTRATION; PERINEURAL at 10:19

## 2024-09-04 NOTE — PROGRESS NOTES
[facility] Orthopedics & Sports Medicine    [facility] Orthopaedics and Sports Medicine  [address] [place] [address], [address]  [place] [address]    Surgery:    7/22/2024  Right Shoulder Arthroscopy With Open Distal Clavicle Excision, Biceps Tenotomy And Open Rotator Cuff Repair - Right    History of Present Illness:    This is a 73 y.o. male who presents to the clinic today for post op follow up for Right Shoulder Arthroscopy With Open Distal Clavicle Excision, Biceps Tenotomy And Open Rotator Cuff Repair - Right on 7/22/2024 .  He is doing well postoperative with no complications.    On exam I can passively raise the arm fully above his head.  He can raise up to about 90 degrees.    At this point he is doing well we will advance his therapy with activity as tolerated.  Will see him back in about 4 to 6 weeks to check his progress.          Electronically signed by Calin Sousa MD on 9/4/2024 at 8:49 AM

## 2024-09-05 RX ORDER — METHYLPREDNISOLONE ACETATE 80 MG/ML
40 INJECTION, SUSPENSION INTRA-ARTICULAR; INTRALESIONAL; INTRAMUSCULAR; SOFT TISSUE ONCE
Status: COMPLETED | OUTPATIENT
Start: 2024-09-05 | End: 2024-09-04

## 2024-09-05 RX ORDER — LIDOCAINE HYDROCHLORIDE 10 MG/ML
0.5 INJECTION, SOLUTION INFILTRATION; PERINEURAL ONCE
Status: COMPLETED | OUTPATIENT
Start: 2024-09-05 | End: 2024-09-04

## 2024-10-16 ENCOUNTER — OFFICE VISIT (OUTPATIENT)
Age: 74
End: 2024-10-16

## 2024-10-16 DIAGNOSIS — Z48.89 POSTOPERATIVE VISIT: Primary | ICD-10-CM

## 2024-10-16 PROCEDURE — 99024 POSTOP FOLLOW-UP VISIT: CPT | Performed by: ORTHOPAEDIC SURGERY

## 2024-10-16 NOTE — PROGRESS NOTES
[facility] Orthopedics & Sports Medicine    [facility] Orthopaedics and Sports Medicine  [address] [place] [address], [address]  [place] [address]    Surgery:    7/22/2024  Right Shoulder Arthroscopy With Open Distal Clavicle Excision, Biceps Tenotomy And Open Rotator Cuff Repair - Right    History of Present Illness:    This is a 73 y.o. male who presents to the clinic today for post op follow up for Right Shoulder Arthroscopy With Open Distal Clavicle Excision, Biceps Tenotomy And Open Rotator Cuff Repair - Right on 7/22/2024 .  He is doing well postoperative.  He has had no complications.    On examination incisions are well-healed he can forward elevate the arm to 520 degrees.  Passive active by taking 170.  Overall he is doing well.    At this point I like to see him back in about 6 weeks with Ana Laura Darden to make sure he is doing okay.  Continue with therapy.  Overall he is doing well          Electronically signed by Calin Sousa MD on 10/16/2024 at 9:35 AM

## 2024-11-20 ENCOUNTER — PREP FOR PROCEDURE (OUTPATIENT)
Age: 74
End: 2024-11-20

## 2024-11-20 ENCOUNTER — OFFICE VISIT (OUTPATIENT)
Age: 74
End: 2024-11-20
Payer: MEDICARE

## 2024-11-20 VITALS — HEIGHT: 68 IN | BODY MASS INDEX: 47.74 KG/M2 | WEIGHT: 315 LBS

## 2024-11-20 DIAGNOSIS — M65.321 TRIGGER INDEX FINGER OF RIGHT HAND: Primary | ICD-10-CM

## 2024-11-20 DIAGNOSIS — M65.321 ACQUIRED TRIGGER FINGER OF RIGHT INDEX FINGER: ICD-10-CM

## 2024-11-20 PROCEDURE — 1123F ACP DISCUSS/DSCN MKR DOCD: CPT

## 2024-11-20 PROCEDURE — 99214 OFFICE O/P EST MOD 30 MIN: CPT

## 2024-11-20 PROCEDURE — 1159F MED LIST DOCD IN RCRD: CPT

## 2024-11-20 NOTE — PROGRESS NOTES
[facility] Orthopedics & Sports Medicine    [facility] Orthopaedics and Sports Medicine  [address], [address]  [place] [address]    Surgery:    7/22/2024  Right Shoulder Arthroscopy With Open Distal Clavicle Excision, Biceps Tenotomy And Open Rotator Cuff Repair - Right    History of Present Illness:    This is a 73 y.o. male who presents to the clinic today for post op follow up for Right Shoulder Arthroscopy With Open Distal Clavicle Excision, Biceps Tenotomy And Open Rotator Cuff Repair - Right on 7/22/2024 .  Patient states that regarding his right shoulder, he is making improvements.  Has been going to physical therapy 3 times a week.  He notes that has been taking ibuprofen and Tylenol as needed for pain.  He notes that his strength and range of motion have improved.  Today he also notes that he is experiencing triggering of his right index finger.  He states that he recently had an injection done by Dr. Sousa over the A1 pulley.  This injection is worn off.  He presents today for further evaluation.    Physical Exam:  Right shoulder: Patient able to forward elevate his right shoulder to roughly 160 degrees on my exam today.  Capacity bring him up to about 170.  He has no pain with Limon Tai test.  He has 4-5 strength resisted forward flexion.  He is able to reach behind his head.  He does have difficulty reaching hide his back but can internally rotate to the paraspinal muscles of his lumbar spine.    Right hand: Patient is able to form fist is able to extend his fingers.  He can abduct and adduct his fingers.  He can flex extend wrist as well as form radial and ulnar deviation wrist.  He has tenderness ovation of the A1 pulley of the index finger.  No tenderness of the other A1 pulleys on exam today.  Negative Tinel's at carpal tunnel.  Negative Durkan's test.  He has good strength resisted flexion.  Sensation is intact light touch.    Imaging:  None.    Plan:  Regarding his right

## 2024-11-22 ENCOUNTER — HOSPITAL ENCOUNTER (OUTPATIENT)
Age: 74
Setting detail: OUTPATIENT SURGERY
Discharge: HOME | End: 2024-11-22
Attending: ORTHOPAEDIC SURGERY | Admitting: ORTHOPAEDIC SURGERY
Payer: MEDICARE

## 2024-11-22 VITALS
BODY MASS INDEX: 47.74 KG/M2 | DIASTOLIC BLOOD PRESSURE: 63 MMHG | SYSTOLIC BLOOD PRESSURE: 151 MMHG | TEMPERATURE: 98.6 F | WEIGHT: 315 LBS | HEART RATE: 62 BPM | HEIGHT: 68 IN | OXYGEN SATURATION: 93 % | RESPIRATION RATE: 15 BRPM

## 2024-11-22 DIAGNOSIS — G89.18 POST-OP PAIN: Primary | ICD-10-CM

## 2024-11-22 PROCEDURE — 6360000002 HC RX W HCPCS: Performed by: ORTHOPAEDIC SURGERY

## 2024-11-22 PROCEDURE — 7100000010 HC PHASE II RECOVERY - FIRST 15 MIN: Performed by: ORTHOPAEDIC SURGERY

## 2024-11-22 PROCEDURE — 7100000011 HC PHASE II RECOVERY - ADDTL 15 MIN: Performed by: ORTHOPAEDIC SURGERY

## 2024-11-22 PROCEDURE — 2709999900 HC NON-CHARGEABLE SUPPLY: Performed by: ORTHOPAEDIC SURGERY

## 2024-11-22 PROCEDURE — 3600000002 HC SURGERY LEVEL 2 BASE: Performed by: ORTHOPAEDIC SURGERY

## 2024-11-22 PROCEDURE — 3600000012 HC SURGERY LEVEL 2 ADDTL 15MIN: Performed by: ORTHOPAEDIC SURGERY

## 2024-11-22 PROCEDURE — 26055 INCISE FINGER TENDON SHEATH: CPT | Performed by: ORTHOPAEDIC SURGERY

## 2024-11-22 RX ORDER — LIDOCAINE HYDROCHLORIDE AND EPINEPHRINE 10; 10 MG/ML; UG/ML
INJECTION, SOLUTION INFILTRATION; PERINEURAL PRN
Status: DISCONTINUED | OUTPATIENT
Start: 2024-11-22 | End: 2024-11-22 | Stop reason: ALTCHOICE

## 2024-11-22 RX ORDER — IBUPROFEN 200 MG
200 TABLET ORAL EVERY 6 HOURS PRN
Status: ON HOLD | COMMUNITY
End: 2024-11-22 | Stop reason: HOSPADM

## 2024-11-22 RX ORDER — TRAMADOL HYDROCHLORIDE 50 MG/1
50 TABLET ORAL EVERY 4 HOURS PRN
Qty: 20 TABLET | Refills: 0 | Status: SHIPPED | OUTPATIENT
Start: 2024-11-22 | End: 2024-11-29

## 2024-11-22 ASSESSMENT — PAIN - FUNCTIONAL ASSESSMENT
PAIN_FUNCTIONAL_ASSESSMENT: NONE - DENIES PAIN
PAIN_FUNCTIONAL_ASSESSMENT: NONE - DENIES PAIN

## 2024-11-22 NOTE — H&P
Patient is able to form fist is able to extend his fingers.  He can abduct and adduct his fingers.  He can flex extend wrist as well as form radial and ulnar deviation wrist.  He has tenderness ovation of the A1 pulley of the index finger.  No tenderness of the other A1 pulleys on exam today.  Negative Tinel's at carpal tunnel.  Negative Durkan's test.  He has good strength resisted flexion.  Sensation is intact light touch.      Diagnostics and Labs  Relevant diagnostic, laboratory and radiological studies have been reviewed in the Electronic Medical Record.    Assessment and Plan  Adan Fuller is a 73 y.o. old male right index trigger finger.  He presents the preoperative area for right index finger trigger finger release.  At this time he wishes to proceed.

## 2024-11-22 NOTE — OP NOTE
Operative Note      Patient: Adan Fuller  YOB: 1950  MRN: [id number]    Date of Procedure: 11/22/2024    Pre-Op Diagnosis Codes:      * Acquired trigger finger of right index finger [M65.321]    Post-Op Diagnosis: Same       Procedure(s):  RIGHT INDEX FINGER TRIGGER RELEASE/TENOSYNOVECTOMY    Surgeon(s):  Calin Sousa MD    Assistant:   Resident: Dale Lopez DO    Anesthesia: Local    Estimated Blood Loss (mL): Minimal    Complications: None    Specimens:   * No specimens in log *    Implants:  * No implants in log *      Drains: * No LDAs found *    Findings:  Infection Present At Time Of Surgery (PATOS) (choose all levels that have infection present):  No infection present  Other Findings: none    Detailed Description of Procedure:   This is a 73 y.o. male  with right index finger trigger finger.  Patient is aware of the risks and benefits of this procedure today and they have elected go ahead with this.  Patient was brought to the operating room placed in the supine position.  We then cleaned with Betadine solution over projected incision areas and injected local anesthetic with epi.  We then prepped and draped the operative extremity in sterile fashion.  Timeout was performed ensuring correct patient correct extremity and correct procedure.    At this time I made an incision over the A1 pulley of the right index finger. I then bluntly dissected down through subcu tissue.  I placed my down curved retractors.  Under direct visualization I sharply incised through the A1 pulley of the right index finger.  I then had the patient make a fist several times, they had good range of motion and no further triggering.  We then closed with 4-0 monofilament subcuticular suture and skin glue.  Sterile dressing was placed over top.    Patient was then transferred to recovery in stable condition.    Electronically signed by Calin Sousa MD on 11/22/2024 at 3:12 PM

## 2024-11-22 NOTE — DISCHARGE INSTRUCTIONS
Activity-activity as tolerated with operative extremity, no heavy gripping    Dressing-change dressing after 2 days then okay to shower and get wet, still do not soak in bathtub,hot tub, swimming pool, etc.    Call  For:  *Fever>101 or persistent low grade temp. Or other signs/symptoms of infection such as sweats/chills, pus-like or foul smelling drainage, redness warmth or excessive swelling  *Increased pain not controlled with current medications,repositioning,ice or elevation or any other comfort measures at home  *Persistent nausea or vomiting  *Excessive bleeding-saturating through dressing  *Color change in hand/fingers-pale or bluish in color, cold, new numbness or tingling

## 2024-11-26 ENCOUNTER — TELEPHONE (OUTPATIENT)
Age: 74
End: 2024-11-26

## 2024-11-26 NOTE — TELEPHONE ENCOUNTER
Patient called stating that he believes that he is having a reaction. Patient has a rash that is itchy, return call to patient to discuss, no answer left voicemail

## 2024-12-06 ENCOUNTER — OFFICE VISIT (OUTPATIENT)
Age: 74
End: 2024-12-06

## 2024-12-06 DIAGNOSIS — Z98.890 S/P TRIGGER FINGER RELEASE: Primary | ICD-10-CM

## 2024-12-06 PROCEDURE — 99024 POSTOP FOLLOW-UP VISIT: CPT

## 2024-12-06 NOTE — PROGRESS NOTES
[facility] Orthopedics & Sports Medicine    [facility] Orthopaedics and Sports Medicine  [address] [place] [address], [address]  [place] [address]    Surgery:    11/22/2024  Right Index Finger Trigger Release/tenosynovectomy - Right    History of Present Illness:    This is a 73 y.o. male who presents to the clinic today for post op follow up for Right Index Finger Trigger Release/tenosynovectomy - Right on 11/22/2024 .  Patient states symptoms have been postoperative.  He notes that he has not been triggering at all at this point.  Pain is well-controlled.  No major concerns today.  He presents today for further evaluation.    Physical Exam:  Right hand: Incision site is clean, dry, intact.  There is no seepage or drainage coming from the incision site.  There is no erythema around the incision site.  Patient is able to form a fist and able to extend his fingers.    Imaging:  None    Plan:  Patient is doing very well postoperative.  I did encourage some gentle scar tissue massage to perform over the incision site once his incision site heals up a bit more.  I also encouraged some stretching exercise as well.  He is aware of things look out for regarding his incision site.  This point fine see him back as needed.  If he is any questions or concerns, he can let us know.  The patient demonstrates understanding and is agreeable to plan.          Electronically signed by GLENN BENAVIDEZ PA-C on 12/6/2024 at 12:05 PM    Please note that this chart was generated using voice recognition Dragon dictation software. Although every effort was made to ensure the accuracy of this automated transcription, some errors in transcription may have occurred.

## 2025-01-07 ENCOUNTER — OFFICE VISIT (OUTPATIENT)
Age: 75
End: 2025-01-07
Payer: MEDICARE

## 2025-01-07 VITALS — HEIGHT: 68 IN | BODY MASS INDEX: 47.74 KG/M2 | WEIGHT: 315 LBS

## 2025-01-07 DIAGNOSIS — M25.511 RIGHT SHOULDER PAIN, UNSPECIFIED CHRONICITY: Primary | ICD-10-CM

## 2025-01-07 PROCEDURE — 1159F MED LIST DOCD IN RCRD: CPT

## 2025-01-07 PROCEDURE — 99213 OFFICE O/P EST LOW 20 MIN: CPT

## 2025-01-07 PROCEDURE — 1123F ACP DISCUSS/DSCN MKR DOCD: CPT

## 2025-01-07 RX ORDER — METHYLPREDNISOLONE 4 MG/1
TABLET ORAL
Qty: 1 KIT | Refills: 0 | Status: SHIPPED | OUTPATIENT
Start: 2025-01-07

## 2025-01-07 NOTE — PROGRESS NOTES
[facility] Orthopaedics & Sports Medicine      [facility]TS MEDICINE  [address]  [place] [address]  Dept: [phone]  Dept Fax: [phone]    Chief Compliant:  Chief Complaint   Patient presents with    right shoulder follow up         History of Present Illness:  This is a pleasant 74 y.o. male who presents to the clinic today for evaluation / follow up of right shoulder pain.  Patient is status post right shoulder arthroscopy with open distal possible excision, biceps nonmeat, and open rotator cuff repair.  The surgery was back on July 22, 2024.  Patient states that roughly a week and a half ago, he was loading a wheelchair in his car and immediately felt pain in his shoulder.  He points to the deltoid aspect of her shoulders where most of his pain is.  He has been taking ibuprofen and Tylenol as needed for pain.  He still currently in physical therapy.  He states that his shoulder has not felt much better since the initial incident.  He presents today for further evaluation.      Physical Exam:    Right shoulder: Patient is able to forward elevate his right shoulder to roughly 150 degrees on my exam today.  He has a negative Limon Tai test.  He has a positive Neer impingement test.  He has some tenderness patient along the glenohumeral joint line as well as over the deltoid.  No major tenderness over the bicipital groove today.  He has 4-5 strength resisted forward flexion which is very similar to his contralateral side.  He is able to reach behind his head with some discomfort.  He has no major pain with external rotation.  He has some discomfort with internal rotation.  He has 5-5 strength resisted external rotation today.    Nursing note and vitals reviewed.     Labs and Imaging:     XR taken today:  No results found.      No orders of the defined types were placed in this

## 2025-03-04 ENCOUNTER — OFFICE VISIT (OUTPATIENT)
Age: 75
End: 2025-03-04

## 2025-03-04 VITALS — HEIGHT: 68 IN | WEIGHT: 315 LBS | BODY MASS INDEX: 47.74 KG/M2

## 2025-03-04 DIAGNOSIS — Z98.890 S/P RIGHT ROTATOR CUFF REPAIR: Primary | ICD-10-CM

## 2025-03-04 RX ORDER — METHYLPREDNISOLONE ACETATE 80 MG/ML
80 INJECTION, SUSPENSION INTRA-ARTICULAR; INTRALESIONAL; INTRAMUSCULAR; SOFT TISSUE ONCE
Status: COMPLETED | OUTPATIENT
Start: 2025-03-04 | End: 2025-03-04

## 2025-03-04 RX ORDER — LIDOCAINE HYDROCHLORIDE 10 MG/ML
2 INJECTION, SOLUTION INFILTRATION; PERINEURAL ONCE
Status: COMPLETED | OUTPATIENT
Start: 2025-03-04 | End: 2025-03-04

## 2025-03-04 RX ADMIN — METHYLPREDNISOLONE ACETATE 80 MG: 80 INJECTION, SUSPENSION INTRA-ARTICULAR; INTRALESIONAL; INTRAMUSCULAR; SOFT TISSUE at 08:31

## 2025-03-04 RX ADMIN — LIDOCAINE HYDROCHLORIDE 2 ML: 10 INJECTION, SOLUTION INFILTRATION; PERINEURAL at 08:31

## 2025-03-04 NOTE — PROGRESS NOTES
FINGER TRIGGER RELEASE Right 09/07/2023    RIGHT RING FINGER A-1 PULLEY TRIGGER RELEASE    FINGER TRIGGER RELEASE Right 09/07/2023    RIGHT RING FINGER A-1 PULLEY TRIGGER RELEASE performed by Calin Sousa MD at [facility] OR    HAND SURGERY Right 11/22/2024    RIGHT INDEX FINGER TRIGGER RELEASE/TENOSYNOVECTOMY performed by Calin Sousa MD at [facility] OR    HERNIA REPAIR      umbilical    HERNIA REPAIR  2005    JOINT REPLACEMENT      bilateral hips    KNEE ARTHROPLASTY Left     Total  knee    OTHER SURGICAL HISTORY      thumb joint replacement    PACEMAKER PLACEMENT  07/2022    SHOULDER ARTHROSCOPY Bilateral     SHOULDER ARTHROSCOPY Right 7/22/2024    RIGHT SHOULDER ARTHROSCOPY WITH OPEN DISTAL CLAVICLE EXCISION, BICEPS TENOTOMY AND OPEN ROTATOR CUFF REPAIR performed by Calin Sousa MD at [facility] OR    TONSILLECTOMY      TOTAL HIP ARTHROPLASTY Bilateral      Family History   Problem Relation Age of Onset    No Known Problems Father         keysha Darden PA-C    Please note that this chart was generated using voice recognition Dragon dictation software. Although every effort was made to ensure the accuracy of this automated transcription, some errors in transcription may have occurred.

## 2025-04-29 ENCOUNTER — OFFICE VISIT (OUTPATIENT)
Age: 75
End: 2025-04-29
Payer: MEDICARE

## 2025-04-29 VITALS — BODY MASS INDEX: 47.74 KG/M2 | HEIGHT: 68 IN | WEIGHT: 315 LBS

## 2025-04-29 DIAGNOSIS — M25.512 LEFT SHOULDER PAIN, UNSPECIFIED CHRONICITY: ICD-10-CM

## 2025-04-29 DIAGNOSIS — M25.511 BILATERAL SHOULDER PAIN, UNSPECIFIED CHRONICITY: Primary | ICD-10-CM

## 2025-04-29 DIAGNOSIS — M25.512 BILATERAL SHOULDER PAIN, UNSPECIFIED CHRONICITY: Primary | ICD-10-CM

## 2025-04-29 PROCEDURE — 1125F AMNT PAIN NOTED PAIN PRSNT: CPT | Performed by: ORTHOPAEDIC SURGERY

## 2025-04-29 PROCEDURE — 99214 OFFICE O/P EST MOD 30 MIN: CPT | Performed by: ORTHOPAEDIC SURGERY

## 2025-04-29 PROCEDURE — 1123F ACP DISCUSS/DSCN MKR DOCD: CPT | Performed by: ORTHOPAEDIC SURGERY

## 2025-04-29 NOTE — PROGRESS NOTES
[facility] Orthopedics & Sports Medicine      [facility] PHYSICIANS [facility] ORTHOPAEDICS AND SPORTS MEDICINE  [facility]  [place] [address]  Dept: [phone]  Dept Fax: [phone]    Chief Compliant:  Chief Complaint   Patient presents with    Shoulder Pain     R shoulder         History of Present Illness:  This is a 74 y.o. male who presents to the clinic today for evaluation / follow up of right shoulder pain.  Patient is status post rotator cuff repair done in July 2024.  States that he has been doing physical therapy 3 times a week since then.  Is still having pain in the deltoid region.  Range of motion is restricted.      Patient was seen on 3/4/2025, prior to that he was given Medrol Dosepak and at that visit was given a subacromial injection which she states that helped him minimally.      Also stating he is having left shoulder pain as well        Physical Exam:    On physical exam, patient has no obvious deformity, erythema on visual inspection.  Range of motion is restricted, to approximately 110 degrees with forward flexion.  Positive Neer sign bilaterally.  Rotator cuff strength is decreased probably 4 out of 5.  There is no instability no effusions of the shoulders.    Nursing note and vitals reviewed.     Labs and Imaging:     XR taken today:  No results found.        Orders Placed This Encounter   Procedures    XR SHOULDER LEFT (MIN 2 VIEWS)     Internal/external/axillary    XR SHOULDER RIGHT (MIN 2 VIEWS)     Internal/external/axillary    CT SHOULDER LEFT W CONTRAST     CT ARTHROGRAM     Standing Status:   Future     Expected Date:   4/29/2025     Expiration Date:   4/29/2026     Additional Contrast?:   None     STAT Creatinine as needed::   No       Assessment and Plan:  1. Bilateral shoulder pain, unspecified chronicity    2. Left shoulder pain, unspecified chronicity          This is a 74 y.o. male with right shoulder pain, status post

## 2025-04-30 ENCOUNTER — HOSPITAL ENCOUNTER (OUTPATIENT)
Age: 75
Discharge: HOME | End: 2025-05-02
Payer: MEDICARE

## 2025-04-30 ENCOUNTER — TELEPHONE (OUTPATIENT)
Age: 75
End: 2025-04-30

## 2025-04-30 PROCEDURE — 73030 X-RAY EXAM OF SHOULDER: CPT

## 2025-05-13 ENCOUNTER — HOSPITAL ENCOUNTER (OUTPATIENT)
Dept: INTERVENTIONAL RADIOLOGY/VASCULAR | Age: 75
Discharge: HOME | End: 2025-05-15
Payer: MEDICARE

## 2025-05-13 ENCOUNTER — HOSPITAL ENCOUNTER (OUTPATIENT)
Dept: CT IMAGING | Age: 75
Discharge: HOME | End: 2025-05-15
Payer: MEDICARE

## 2025-05-13 DIAGNOSIS — M25.512 LEFT SHOULDER PAIN, UNSPECIFIED CHRONICITY: ICD-10-CM

## 2025-05-13 PROCEDURE — 77002 NEEDLE LOCALIZATION BY XRAY: CPT

## 2025-05-13 PROCEDURE — 73201 CT UPPER EXTREMITY W/DYE: CPT

## 2025-05-13 PROCEDURE — 20610 DRAIN/INJ JOINT/BURSA W/O US: CPT

## 2025-05-13 PROCEDURE — 6360000004 HC RX CONTRAST MEDICATION: Performed by: ORTHOPAEDIC SURGERY

## 2025-05-13 RX ADMIN — IOHEXOL 5 ML: 240 INJECTION, SOLUTION INTRATHECAL; INTRAVASCULAR; INTRAVENOUS; ORAL at 08:32

## 2025-05-13 NOTE — BRIEF OP NOTE
Brief Postoperative Note for Shoulder Arthrogram    Adan Fuller  YOB: 1950  [id number]    Pre-operative Diagnosis:  Left Shoulder Pain     Post-operative Diagnosis: Same    Procedure: Fluoroscopy guided left shoulder arthrogram     Anesthesia: 1% Lidocaine     Surgeons/Assistants: Bettie Jansen PA-C    Complications: none    EBL: Minimal    Specimens: Were not obtained    Successful left shoulder arthrogram.  12 mL of dilute iodinated contrast mixture injected.  Dressing applied. CT to follow. Patient tolerated procedure well.    Electronically signed by Bettie Jansen PA-C on 5/13/2025 at 8:35 AM

## 2025-05-13 NOTE — PROGRESS NOTES
Consent and allergies verified left shoulder arthrogram.  LA/RN; MC/RT; KP/RT  Bettie A/PAC  Supine on table; Left shoulder prepped/draped by KP/RT.  Time out done  Fluoro in use, left shoulder lidocaine injection and access in place by JAME/HILDA  15 mls of contrast dye/saline mix injected by JAME/HILDA.  Access removed and band aid applied by JAME/HILDA  Pt tolerated procedure well.  Pt brought to CT department, ambulatory.

## 2025-06-04 ENCOUNTER — OFFICE VISIT (OUTPATIENT)
Age: 75
End: 2025-06-04
Payer: MEDICARE

## 2025-06-04 VITALS — BODY MASS INDEX: 47.74 KG/M2 | WEIGHT: 315 LBS | HEIGHT: 68 IN

## 2025-06-04 DIAGNOSIS — M25.512 CHRONIC LEFT SHOULDER PAIN: Primary | ICD-10-CM

## 2025-06-04 DIAGNOSIS — G89.29 CHRONIC LEFT SHOULDER PAIN: Primary | ICD-10-CM

## 2025-06-04 PROCEDURE — 1125F AMNT PAIN NOTED PAIN PRSNT: CPT | Performed by: ORTHOPAEDIC SURGERY

## 2025-06-04 PROCEDURE — 99214 OFFICE O/P EST MOD 30 MIN: CPT | Performed by: ORTHOPAEDIC SURGERY

## 2025-06-04 PROCEDURE — 1123F ACP DISCUSS/DSCN MKR DOCD: CPT | Performed by: ORTHOPAEDIC SURGERY

## 2025-06-04 NOTE — PROGRESS NOTES
[facility] Orthopedics & Sports Medicine      [facility] PHYSIC[facility]ANS [facility] ORTHOPAEDICS AND SPORTS MEDICINE  [address] [place], [place] [address]  [place] [address]  Dept: [phone]  Dept Fax: [phone]    Chief Compliant:  Chief Complaint   Patient presents with    Shoulder Pain     L Shoulder CT Review         History of Present Illness:  This is a 74 y.o. male who presents to the clinic today for evaluation / follow up of follow-up CT scan of the left shoulder.  He states that he does get pain in the shoulder still but it is tolerable.    .       Physical Exam:    On exam he can raise arm above his head.  Does have tenderness over the AC joint.  Cross body adduction test really does not give him much trouble.  He has intact rotator cuff strength.  Negative Limon positive Neer sign    Nursing note and vitals reviewed.     Labs and Imaging:   CT arthrogram of the left shoulder does not show any full-thickness rotator cuff tear.  XR taken today:  No results found.      No orders of the defined types were placed in this encounter.      Assessment and Plan:  No diagnosis found.      This is a 74 y.o. male with left shoulder AC joint arthropathy and shoulder impingement.  I discussed with him I do not see anything that I think necessitates surgery at this point unless he is clinically uncomfortable or painful enough.  He does not feel like that is the case.  At this point we will continue with conservative treatment we can see him back if this should get any worse.  He understood this we will see him back as needed.         Review of Systems   Constitutional: Negative for fever, chills, sweats.   Neurological: Negative numbness, or weakness.   Integumentary: Negative for rash, itching, laceration, or abrasion.   Musculoskeletal: Positive for Shoulder Pain (L Shoulder CT Review )           Past History:    Current Outpatient Medications:     methylPREDNISolone (MEDROL, ANNELIESE,) 4 MG

## (undated) DEVICE — YANKAUER,SMOOTH HANDLE,HIGH CAPACITY: Brand: MEDLINE INDUSTRIES, INC.

## (undated) DEVICE — DRAPE,U/ SHT,SPLIT,PLAS,STERIL: Brand: MEDLINE

## (undated) DEVICE — BANDAGE COBAN 6 IN WND 6INX5YD FOAM

## (undated) DEVICE — GLOVE SURG SZ 65 L12IN FNGR THK126MIL CRM LTX FREE

## (undated) DEVICE — STRAP,POSITIONING,KNEE/BODY,FOAM,4X60": Brand: MEDLINE

## (undated) DEVICE — APPLICATOR MEDICATED 26 CC SOLUTION HI LT ORNG CHLORAPREP

## (undated) DEVICE — SUTURE ETHIBOND 2 L30IN NONABSORBABLE GRN WHT LT GRN MO-7 D8793

## (undated) DEVICE — SYRINGE, LUER LOCK, 10ML: Brand: MEDLINE

## (undated) DEVICE — LIQUIBAND RAPID ADHESIVE 36/CS 0.8ML: Brand: MEDLINE

## (undated) DEVICE — MHPB HAND AND FOOT PACK: Brand: MEDLINE INDUSTRIES, INC.

## (undated) DEVICE — NEEDLE HYPO 25GA L1.5IN BLU POLYPR HUB S STL REG BVL STR

## (undated) DEVICE — INTENT TO BE USED WITH SUTURE MATERIAL FOR TISSUE CLOSURE: Brand: RICHARD-ALLAN® NEEDLE 1/2 CIRCLE TAPER

## (undated) DEVICE — SOLUTION IRRIG 1000ML 0.9% SOD CHL USP POUR PLAS BTL

## (undated) DEVICE — GLOVE SURG SZ 8 L12IN THK75MIL DK GRN LTX FREE

## (undated) DEVICE — ELECTRODE PT RET AD L9FT HI MOIST COND ADH HYDRGEL CORDED

## (undated) DEVICE — STOCKINETTE,IMPERVIOUS,12X48,STERILE: Brand: MEDLINE

## (undated) DEVICE — SUTURE MONOCRYL SZ 3 0 L18IN ABSRB UD PS 2 3 8 CIR REV CUT NDL MCP497G

## (undated) DEVICE — GLOVE SURG SZ 7 L12IN THK7.5MIL DK GRN LTX FREE MSG6570] MEDLINE INDUSTRIES INC]

## (undated) DEVICE — BLUNTFILL: Brand: MONOJECT

## (undated) DEVICE — SUTURE MONOCRYL SZ 4-0 L18IN ABSRB UD P-3 L13MM 3/8 CIR PRIM Y494G

## (undated) DEVICE — Device

## (undated) DEVICE — SUTURE PROL SZ 1 L60IN NONABSORBABLE BLU L65MM TP-1 3/8 CIR 8824G

## (undated) DEVICE — GLOVE ORTHO 7 1/2   MSG9475

## (undated) DEVICE — CLOTH PRE OP W9XL10.5IN 2% CHG FRAGRANCE RNS FREE READYPREP

## (undated) DEVICE — SYRINGE IRRIG 60ML SFT PLIABLE BLB EZ TO GRP 1 HND USE W/

## (undated) DEVICE — GLOVE SURG SZ 65 L12IN THK75MIL DK GRN LTX FREE

## (undated) DEVICE — SWABSTICK MEDICATED L3.9IN POLYPR SHFT COT TIP SAT 70% ISO

## (undated) DEVICE — SUTURE PROL SZ 3-0 L18IN NONABSORBABLE BLU L24MM FS-1 3/8 8684G

## (undated) DEVICE — WIPES SKIN CLOTH READYPREP 9 X 10.5 IN 2% CHLORHEX GLUCONATE CHG PREOP

## (undated) DEVICE — TUBING PMP L16FT MAIN DISP FOR AR-6400 AR-6475

## (undated) DEVICE — GLOVE ORANGE PI 8   MSG9080

## (undated) DEVICE — SUTURE VICRYL + SZ 3-0 L36IN ABSRB UD L36MM CT-1 1/2 CIR VCP944H

## (undated) DEVICE — IMMOBILIZER SHLDR AD XL L20IN D10IN BLK POLY COT CLSR ENV

## (undated) DEVICE — GLOVE ORANGE PI 7 1/2   MSG9075

## (undated) DEVICE — BLADE SHV L13CM DIA4MM EXCALIBUR AGG COOLCUT

## (undated) DEVICE — POUCH INSTR W6.75XL11.5IN FRST 2 PKT ADH FOR ORTH AND

## (undated) DEVICE — SOLUTION IRRIG 3000ML 0.9% SOD CHL USP UROMATIC PLAS CONT

## (undated) DEVICE — GLOVE SURG SZ 65 THK91MIL LTX FREE SYN POLYISOPRENE

## (undated) DEVICE — NEPTUNE E-SEP SMOKE EVACUATION PENCIL, COATED, 70MM BLADE, PUSH BUTTON SWITCH: Brand: NEPTUNE E-SEP

## (undated) DEVICE — SUTURE MCRYL SZ 4-0 L18IN ABSRB UD P-3 L13MM 3/8 CIR PRIM Y494G

## (undated) DEVICE — MHPB ARTHROSCOPY PACK: Brand: MEDLINE INDUSTRIES, INC.

## (undated) DEVICE — ELECTRODE ES L3IN S STL BLDE INSUL DISP VALLEYLAB EDGE

## (undated) DEVICE — DRESSING FOAM W4XL4IN AG SIL FACE BORD IONIC ANTIMIC ADH

## (undated) DEVICE — RASP SURG L W0.27XL0.55IN TEAR CRSS CUT MIC RECIP SAW

## (undated) DEVICE — BLANKET WRM W40.2XL55.9IN IORT LO BODY + MISTRAL AIR

## (undated) DEVICE — DRAPE,SHOULDER,BEACH CHAIR,STERILE: Brand: MEDLINE

## (undated) DEVICE — SWABSTICK MEDICATED 10% POVIDONE IOD PVP SGL ANTISEP SAT

## (undated) DEVICE — PRECISION OFFSET (9.0 X 0.64 X 25.0MM)

## (undated) DEVICE — SUTURE VICRYL + SZ 0 L27IN ABSRB VLT L26MM UR-6 5/8 CIR VCP603H